# Patient Record
Sex: MALE | Race: OTHER | HISPANIC OR LATINO | URBAN - METROPOLITAN AREA
[De-identification: names, ages, dates, MRNs, and addresses within clinical notes are randomized per-mention and may not be internally consistent; named-entity substitution may affect disease eponyms.]

---

## 2018-10-10 ENCOUNTER — INPATIENT (INPATIENT)
Facility: HOSPITAL | Age: 68
LOS: 3 days | Discharge: HOME HEALTH | DRG: 579 | End: 2018-10-14
Attending: STUDENT IN AN ORGANIZED HEALTH CARE EDUCATION/TRAINING PROGRAM | Admitting: STUDENT IN AN ORGANIZED HEALTH CARE EDUCATION/TRAINING PROGRAM
Payer: COMMERCIAL

## 2018-10-10 VITALS
RESPIRATION RATE: 18 BRPM | DIASTOLIC BLOOD PRESSURE: 69 MMHG | TEMPERATURE: 98 F | SYSTOLIC BLOOD PRESSURE: 131 MMHG | WEIGHT: 219.8 LBS | OXYGEN SATURATION: 98 % | HEART RATE: 64 BPM

## 2018-10-10 DIAGNOSIS — R63.8 OTHER SYMPTOMS AND SIGNS CONCERNING FOOD AND FLUID INTAKE: ICD-10-CM

## 2018-10-10 DIAGNOSIS — I25.10 ATHEROSCLEROTIC HEART DISEASE OF NATIVE CORONARY ARTERY WITHOUT ANGINA PECTORIS: ICD-10-CM

## 2018-10-10 DIAGNOSIS — Z95.1 PRESENCE OF AORTOCORONARY BYPASS GRAFT: Chronic | ICD-10-CM

## 2018-10-10 DIAGNOSIS — G92 TOXIC ENCEPHALOPATHY: ICD-10-CM

## 2018-10-10 DIAGNOSIS — I48.91 UNSPECIFIED ATRIAL FIBRILLATION: ICD-10-CM

## 2018-10-10 DIAGNOSIS — I10 ESSENTIAL (PRIMARY) HYPERTENSION: ICD-10-CM

## 2018-10-10 DIAGNOSIS — D64.9 ANEMIA, UNSPECIFIED: ICD-10-CM

## 2018-10-10 DIAGNOSIS — Z29.9 ENCOUNTER FOR PROPHYLACTIC MEASURES, UNSPECIFIED: ICD-10-CM

## 2018-10-10 DIAGNOSIS — E11.9 TYPE 2 DIABETES MELLITUS WITHOUT COMPLICATIONS: ICD-10-CM

## 2018-10-10 DIAGNOSIS — Z91.89 OTHER SPECIFIED PERSONAL RISK FACTORS, NOT ELSEWHERE CLASSIFIED: ICD-10-CM

## 2018-10-10 DIAGNOSIS — L03.90 CELLULITIS, UNSPECIFIED: ICD-10-CM

## 2018-10-10 LAB
ALBUMIN SERPL ELPH-MCNC: 3.5 G/DL — SIGNIFICANT CHANGE UP (ref 3.3–5)
ALP SERPL-CCNC: 84 U/L — SIGNIFICANT CHANGE UP (ref 40–120)
ALT FLD-CCNC: 45 U/L — SIGNIFICANT CHANGE UP (ref 10–45)
ANION GAP SERPL CALC-SCNC: 15 MMOL/L — SIGNIFICANT CHANGE UP (ref 5–17)
APTT BLD: 33.8 SEC — SIGNIFICANT CHANGE UP (ref 27.5–37.4)
AST SERPL-CCNC: 38 U/L — SIGNIFICANT CHANGE UP (ref 10–40)
BASOPHILS NFR BLD AUTO: 0.5 % — SIGNIFICANT CHANGE UP (ref 0–2)
BILIRUB SERPL-MCNC: 0.6 MG/DL — SIGNIFICANT CHANGE UP (ref 0.2–1.2)
BUN SERPL-MCNC: 37 MG/DL — HIGH (ref 7–23)
CALCIUM SERPL-MCNC: 9.2 MG/DL — SIGNIFICANT CHANGE UP (ref 8.4–10.5)
CHLORIDE SERPL-SCNC: 100 MMOL/L — SIGNIFICANT CHANGE UP (ref 96–108)
CK MB CFR SERPL CALC: 1.6 NG/ML — SIGNIFICANT CHANGE UP (ref 0–6.7)
CK SERPL-CCNC: 48 U/L — SIGNIFICANT CHANGE UP (ref 30–200)
CO2 SERPL-SCNC: 24 MMOL/L — SIGNIFICANT CHANGE UP (ref 22–31)
CREAT SERPL-MCNC: 2.3 MG/DL — HIGH (ref 0.5–1.3)
EOSINOPHIL NFR BLD AUTO: 0.5 % — SIGNIFICANT CHANGE UP (ref 0–6)
GLUCOSE SERPL-MCNC: 116 MG/DL — HIGH (ref 70–99)
HCT VFR BLD CALC: 29.9 % — LOW (ref 39–50)
HGB BLD-MCNC: 9.9 G/DL — LOW (ref 13–17)
INR BLD: 1.27 — HIGH (ref 0.88–1.16)
LACTATE SERPL-SCNC: 1.2 MMOL/L — SIGNIFICANT CHANGE UP (ref 0.5–2)
LYMPHOCYTES # BLD AUTO: 29.4 % — SIGNIFICANT CHANGE UP (ref 13–44)
MCHC RBC-ENTMCNC: 29.5 PG — SIGNIFICANT CHANGE UP (ref 27–34)
MCHC RBC-ENTMCNC: 33.1 G/DL — SIGNIFICANT CHANGE UP (ref 32–36)
MCV RBC AUTO: 89 FL — SIGNIFICANT CHANGE UP (ref 80–100)
MONOCYTES NFR BLD AUTO: 8 % — SIGNIFICANT CHANGE UP (ref 2–14)
NEUTROPHILS NFR BLD AUTO: 61.6 % — SIGNIFICANT CHANGE UP (ref 43–77)
PLATELET # BLD AUTO: 238 K/UL — SIGNIFICANT CHANGE UP (ref 150–400)
POTASSIUM SERPL-MCNC: 3.3 MMOL/L — LOW (ref 3.5–5.3)
POTASSIUM SERPL-SCNC: 3.3 MMOL/L — LOW (ref 3.5–5.3)
PROT SERPL-MCNC: 7.4 G/DL — SIGNIFICANT CHANGE UP (ref 6–8.3)
PROTHROM AB SERPL-ACNC: 14.2 SEC — HIGH (ref 9.8–12.7)
RBC # BLD: 3.36 M/UL — LOW (ref 4.2–5.8)
RBC # FLD: 14.3 % — SIGNIFICANT CHANGE UP (ref 10.3–16.9)
SODIUM SERPL-SCNC: 139 MMOL/L — SIGNIFICANT CHANGE UP (ref 135–145)
TROPONIN T SERPL-MCNC: <0.01 NG/ML — SIGNIFICANT CHANGE UP (ref 0–0.01)
WBC # BLD: 8.1 K/UL — SIGNIFICANT CHANGE UP (ref 3.8–10.5)
WBC # FLD AUTO: 8.1 K/UL — SIGNIFICANT CHANGE UP (ref 3.8–10.5)

## 2018-10-10 PROCEDURE — 70450 CT HEAD/BRAIN W/O DYE: CPT | Mod: 26

## 2018-10-10 PROCEDURE — 99285 EMERGENCY DEPT VISIT HI MDM: CPT

## 2018-10-10 PROCEDURE — 99223 1ST HOSP IP/OBS HIGH 75: CPT | Mod: GC

## 2018-10-10 RX ORDER — INSULIN LISPRO 100/ML
VIAL (ML) SUBCUTANEOUS
Qty: 0 | Refills: 0 | Status: DISCONTINUED | OUTPATIENT
Start: 2018-10-10 | End: 2018-10-11

## 2018-10-10 RX ORDER — APIXABAN 2.5 MG/1
5 TABLET, FILM COATED ORAL EVERY 12 HOURS
Qty: 0 | Refills: 0 | Status: DISCONTINUED | OUTPATIENT
Start: 2018-10-10 | End: 2018-10-11

## 2018-10-10 RX ORDER — SODIUM CHLORIDE 9 MG/ML
1000 INJECTION, SOLUTION INTRAVENOUS
Qty: 0 | Refills: 0 | Status: DISCONTINUED | OUTPATIENT
Start: 2018-10-10 | End: 2018-10-11

## 2018-10-10 RX ORDER — SODIUM CHLORIDE 9 MG/ML
1000 INJECTION INTRAMUSCULAR; INTRAVENOUS; SUBCUTANEOUS
Qty: 0 | Refills: 0 | Status: DISCONTINUED | OUTPATIENT
Start: 2018-10-10 | End: 2018-10-14

## 2018-10-10 RX ORDER — CLOPIDOGREL BISULFATE 75 MG/1
75 TABLET, FILM COATED ORAL DAILY
Qty: 0 | Refills: 0 | Status: DISCONTINUED | OUTPATIENT
Start: 2018-10-10 | End: 2018-10-11

## 2018-10-10 RX ORDER — VANCOMYCIN HCL 1 G
1500 VIAL (EA) INTRAVENOUS EVERY 12 HOURS
Qty: 0 | Refills: 0 | Status: DISCONTINUED | OUTPATIENT
Start: 2018-10-11 | End: 2018-10-11

## 2018-10-10 RX ORDER — VANCOMYCIN HCL 1 G
1000 VIAL (EA) INTRAVENOUS ONCE
Qty: 0 | Refills: 0 | Status: COMPLETED | OUTPATIENT
Start: 2018-10-10 | End: 2018-10-10

## 2018-10-10 RX ORDER — DEXTROSE 50 % IN WATER 50 %
15 SYRINGE (ML) INTRAVENOUS ONCE
Qty: 0 | Refills: 0 | Status: DISCONTINUED | OUTPATIENT
Start: 2018-10-10 | End: 2018-10-11

## 2018-10-10 RX ORDER — CARVEDILOL PHOSPHATE 80 MG/1
40 CAPSULE, EXTENDED RELEASE ORAL EVERY 12 HOURS
Qty: 0 | Refills: 0 | Status: DISCONTINUED | OUTPATIENT
Start: 2018-10-10 | End: 2018-10-10

## 2018-10-10 RX ORDER — DEXTROSE 50 % IN WATER 50 %
12.5 SYRINGE (ML) INTRAVENOUS ONCE
Qty: 0 | Refills: 0 | Status: DISCONTINUED | OUTPATIENT
Start: 2018-10-10 | End: 2018-10-11

## 2018-10-10 RX ORDER — ATORVASTATIN CALCIUM 80 MG/1
40 TABLET, FILM COATED ORAL AT BEDTIME
Qty: 0 | Refills: 0 | Status: DISCONTINUED | OUTPATIENT
Start: 2018-10-10 | End: 2018-10-11

## 2018-10-10 RX ORDER — CARVEDILOL PHOSPHATE 80 MG/1
12.5 CAPSULE, EXTENDED RELEASE ORAL EVERY 12 HOURS
Qty: 0 | Refills: 0 | Status: DISCONTINUED | OUTPATIENT
Start: 2018-10-11 | End: 2018-10-11

## 2018-10-10 RX ORDER — DEXTROSE 50 % IN WATER 50 %
25 SYRINGE (ML) INTRAVENOUS ONCE
Qty: 0 | Refills: 0 | Status: DISCONTINUED | OUTPATIENT
Start: 2018-10-10 | End: 2018-10-11

## 2018-10-10 RX ORDER — GLUCAGON INJECTION, SOLUTION 0.5 MG/.1ML
1 INJECTION, SOLUTION SUBCUTANEOUS ONCE
Qty: 0 | Refills: 0 | Status: DISCONTINUED | OUTPATIENT
Start: 2018-10-10 | End: 2018-10-11

## 2018-10-10 RX ADMIN — Medication 250 MILLIGRAM(S): at 19:48

## 2018-10-10 NOTE — ED ADULT NURSE NOTE - OBJECTIVE STATEMENT
69 y/o M sent in by PMD for r/o septic emboli secondary to pertinent cardiac hx and hx of surgery for multiple MRSA infected abscesses/wounds. Pt has undergone several tx with abx and surgery for perirectal mrsa wound, now presents with worsening abscess on back mid scapula. Pt's original wounds began after a trip to the [place] in August where he sustained a multitude of mosquito and other bug bites. Area is hot to touch, indurated and edematous with one focal area of eschar, no purulent drainage noted. Pt also has wound to L groin/scrotum and a healing perirectal abscess on the L side. Dr Borja at bedside for evaluation. Hx cardiac stents placed, hx DM. #18g placed RAC blood cultures collected x 3, labs sent awaiting urine. Dr Borja contacted and collaborated with pt's PMD, pt medicated with vancomycin per order, pt's MRSA is sensitive to vanco per PMD. Pt denies fevers/chills/n/v/d, VSS. Awaiting urine. Pt in NAD, in contact isolation room.

## 2018-10-10 NOTE — H&P ADULT - ASSESSMENT
69 y/o M w/ PMHx notable for DM, HTN, CAD s/p PCI, CABG (2008), Afib on Eliquis, CKD and recurrent MRSA abscess who is presenting with upper left back abscess.

## 2018-10-10 NOTE — H&P ADULT - PROBLEM SELECTOR PLAN 5
Patient reports taking Victoza and Glibiride at home. Currently unsure of dosages.   -Will start patient on ISS and adjust insulin accordingly  -F/u HgbA1C

## 2018-10-10 NOTE — H&P ADULT - ATTENDING COMMENTS
Pt seen and examined at bedside on 10/10/2018 @ 2300    Agree with HPI, ROS as above.     VS, Labs, FH, SH, allergies, medications, imaging reviewed. Agree with physical exam as above     A/P: 69 y/o M w/ PMHx notable for DM, HTN, CAD s/p PCI, CABG (2008), Afib on Eliquis, CKD and recurrent MRSA abscess who is presenting with upper left back abscess.     **Cellulitis  -Purulent cellulitis with large abscess on back as described above  -Surgery consult for likely drainage  -C/w Vancomycin  -Derm consult given new pruritic rash - Benadryl PRN for pruritis     Plan otherwise as outlined above.....

## 2018-10-10 NOTE — ED ADULT TRIAGE NOTE - CHIEF COMPLAINT QUOTE
wounds after surgery - and 2 more wounds on back  and in groin - wounds were MRSA positive --sent to R/O septic emboli -- wounds have blackened areas; and R/O endocarditis

## 2018-10-10 NOTE — H&P ADULT - PMH
Atrial fibrillation    CAD (coronary artery disease)    CKD (chronic kidney disease)    Diabetes    Hypertension

## 2018-10-10 NOTE — ED PROVIDER NOTE - PHYSICAL EXAMINATION
CON: ao x 3, HENMT: clear oropharynx, soft neck, HEAD: atraumatic, CV: rrr, equal pulses b/l, RESP: cta b/l, GI: +BS, soft, nontender, no rebound, no guarding, SKIN: area of fluctuance to upper back, w/ induration, small area of induration to R groin, no drainage, no bleeding, no rash noted to perineal area, no crepitus, no evidence of jenni's on exam, MSK: no deformities, NEURO: no gross motor or sensory deficit

## 2018-10-10 NOTE — H&P ADULT - PROBLEM SELECTOR PLAN 10
1) PCP Contacted on Admission: (Y/N) --> Name & Phone #: Dr. Goel  [phone]  2) Date of Contact with PCP:  3) PCP Contacted at Discharge: (Y/N, N/A)  4) Summary of Handoff Given to PCP:   5) Post-Discharge Appointment Date and Location:

## 2018-10-10 NOTE — H&P ADULT - NSHPLABSRESULTS_GEN_ALL_CORE
.  LABS:                         9.9    8.1   )-----------( 238      ( 10 Oct 2018 19:04 )             29.9     10-10    139  |  100  |  37<H>  ----------------------------<  116<H>  3.3<L>   |  24  |  2.30<H>    Ca    9.2      10 Oct 2018 19:04    TPro  7.4  /  Alb  3.5  /  TBili  0.6  /  DBili  x   /  AST  38  /  ALT  45  /  AlkPhos  84  10-10    PT/INR - ( 10 Oct 2018 19:04 )   PT: 14.2 sec;   INR: 1.27          PTT - ( 10 Oct 2018 19:04 )  PTT:33.8 sec    CARDIAC MARKERS ( 10 Oct 2018 19:04 )  x     / <0.01 ng/mL / 48 U/L / x     / 1.6 ng/mL        Lactate, Blood: 1.2 mmoL/L (10-10 @ 19:05)      RADIOLOGY, EKG & ADDITIONAL TESTS: Reviewed.

## 2018-10-10 NOTE — H&P ADULT - FAMILY HISTORY
Father  Still living? Unknown  No family history of cardiovascular disease, Age at diagnosis: Age Unknown     Mother  Still living? Unknown  No family history of cardiovascular disease, Age at diagnosis: Age Unknown

## 2018-10-10 NOTE — H&P ADULT - PROBLEM SELECTOR PLAN 2
Patient w/ episode of total body tremors where his eyes rolled back and lost consciousness afterwards. As per wife patient was unconscious for 2-3 minutes and was confused and very slow to respond afterwards. Prior to episode patient was very diaphoretic and clammy. Concern for possible febrile seizure vs. meningitis though less likely given pt with no nuchal rigidity on exam. Patient is currently AAOx3 but is poor historian and majority of history was obtained from wife.   -CT head negative for hemorrage or infarct.   -Will place patient on VEEG   -If patient w/ no improvement in mental status will obtain LP Patient w/ episode of total body tremors where his eyes rolled back and lost consciousness afterwards. As per wife patient was unconscious for 2-3 minutes and was confused and very slow to respond afterwards. Prior to episode patient was very diaphoretic and clammy. Concern for possible febrile seizure vs. meningitis though less likely given pt with no nuchal rigidity on exam. Patient is currently AAOx3 but is poor historian and majority of history was obtained from wife.   -CT head negative for hemorrhage or infarct.   -Will place patient on VEEG   -If patient w/ no improvement in mental status will obtain LP Patient w/ episode of total body tremors where his eyes rolled back and lost consciousness afterwards. As per wife patient was unconscious for 2-3 minutes and was confused and very slow to respond afterwards. Prior to episode patient was very diaphoretic and clammy. Concern for possible febrile seizure vs. meningitis though less likely given pt with no nuchal rigidity on exam. Patient is currently AAOx3 but is poor historian and majority of history was obtained from wife.   -CT head negative for hemorrhage or infarct.   -Will place patient on VEEG Patient w/ episode of total body tremors where his eyes rolled back and lost consciousness afterwards. As per wife patient was unconscious for 2-3 minutes and was confused and very slow to respond afterwards. Prior to episode patient was very diaphoretic and clammy. Patients glucose was normal during episode (164). Concern for possible febrile seizure vs. meningitis though less likely given pt with no nuchal rigidity on exam. Patient is currently AAOx3 but is poor historian and majority of history was obtained from wife.   -CT head negative for hemorrhage or infarct.   -Will place patient on VEEG Patient w/ episode of total body tremors where his eyes rolled back and lost consciousness afterwards. As per wife patient was unconscious for 2-3 minutes and was confused and very slow to respond afterwards. Prior to episode patient was very diaphoretic and clammy. Patients glucose was normal during episode (164). Concern for possible febrile seizure vs. meningitis though less likely given pt with no nuchal rigidity on exam and is afebrile w/ no leukocytosis. Patient is currently AAOx3 but is poor historian and majority of history was obtained from wife.   -CT head negative for hemorrhage or infarct.   -Will place patient on VEEG

## 2018-10-10 NOTE — ED PROVIDER NOTE - OBJECTIVE STATEMENT
68 yom pw recurrent MRSA wound/abscess.  hx of DM, CABG, initially treated for perirectal abscess w/ vanco/doxy and outpatient surg drainage, noted additional wound to L groin, and now upper back abscess.  no fever, no chills. no cp, no sob.  no hx of valvular replacement.  wound care concern for endocarditis, and sent pt in for further eval.

## 2018-10-10 NOTE — ED PROVIDER NOTE - MEDICAL DECISION MAKING DETAILS
hx of cabg, hx of dm, hx of recurrent mrsa, previous wound culture sensitive to vanco, will empiric dose vanco, vital sign not c/w sirs, outpatient [phone] Dr. Manasa Herrera from cardiology aware of case, will f/u as cardiology

## 2018-10-10 NOTE — H&P ADULT - PROBLEM SELECTOR PLAN 7
Patient follows up with Dr. Herrera for cardiology.   -C/w Plavix 75mg daily  -Reports that he no longer takes ASA 81mg since starting Eliquis  -Will start patient on Lipitor 40mg pending med rec

## 2018-10-10 NOTE — H&P ADULT - HISTORY OF PRESENT ILLNESS
67 y/o M w/ PMHx notable for DM, HTN, CAD s/p PCI, CABG (2008), Afib on Eliquis, CKD and recurrent MRSA abscess who is presenting with upper left back abscess. Patient reports that in August he was in [place] where he had several bug bites that were very pruritic. Upon return from the [place] patient reports that the bug bite on his left gluteal region had become inflamed and larger in size and was draining yellow thick fluid. At that time patient went to the Emergency Department at [facility] in [place] where he had an I&D and was admitted for about 1 week to receive IV abx. At that time patient was told that he was being treated for MRSA. Upon discharge patient was discharged on 2 different oral abx and was advised to follow up with outpatient wound care. Course further complicated when last weekend patient and wife went to [place]. While there the wife reports that she changed the christelle-gluteal abscess bandage and noted that his left groin had developed an abscess as well with yellow/green fluid draining. On Sunday evening patient had an episode where the wife reports his eyes rolled back and he had "full body tremors" and lost consciousness. Patient was unconscious for about 2-3 minutes and upon awakening wife reports that patient was very confused and slow to respond. During the episode wife reports that patient was very diaphoretic and clammy. Wife also reports that patient had several episodes where he had loss of his bowel. No loss of bladder or tongue biting. No history of seizures. Given this episode patient and wife returned to [place] early Tuesday morning and went to the ED at [facility] where he was given dose of IV abx and advised to follow up with his wound care nurse. Patient and his wife went to wound care nurse who was concerned for patients left groin and recommended patient go to a different [facility] ER where he was given additional dose of IV abx and recommended to follow up with his outpatient PCP. Wife reports that after the IV abx his left groin abscess had resolved but that he had developed a new abscess on his left upper shoulder. She reports that she tried to squeeze it but was unable to due to significant pain. They saw their outpatient PCP on day of admission and she advised patient come to the ED given patients symptoms and newely developed left upper back abscess.    In the ED: T: 98.1, HR: 64, BP: 131/69, RR: 18, O2: 98%  Labs notable for Hgb of 9.9. No leukocytosis. Potassium of 3.3. Cr 2.30  CT head negative for hemorrhage or acute infarct. Inflammatory disease involving the left posterior ethmoid and sphenoid sinus.   Patient given Vancomycin 1g x1. Blood cultures obtained.

## 2018-10-10 NOTE — H&P ADULT - PROBLEM SELECTOR PLAN 1
Patient w/ recent MRSA abscess of left christelle-gluteal region with tract formation to left groin that has since improved s/p IV abx and 2 various oral abx. Patient is now presenting w/ Left upper back abscess that is about 9x7cm erythematous, tender with palpable induration. No drainage noted.   -s/p Vancomycin 1g in the ED. C/w Vancomycin 1000mg BID  -F/u blood cultures  -Surgical Consult in AM for potential drainage Patient w/ recent MRSA abscess of left christelle-gluteal region with tract formation to left groin that has since improved s/p IV abx and 2 various oral abx. Patient is now presenting w/ Left upper back abscess that is about 9x7cm erythematous, tender with palpable induration. No drainage noted. Given patient with recurrent MRSA will need to rule out valvular disease and endocarditis.   -s/p Vancomycin 1g in the ED. C/w Vancomycin 1000mg BID  -F/u blood cultures  -F/u Echocardiogram  -Surgical Consult in AM for potential drainage Patient w/ recent MRSA abscess of left christelle-gluteal region with tract formation to left groin that has since improved s/p IV abx and 2 various oral abx. Patient is now presenting w/ Left upper back abscess that is about 9x7cm erythematous, tender with palpable induration. No drainage noted. Given patient with recurrent MRSA will need to rule out valvular disease and endocarditis.   -s/p Vancomycin 1g in the ED. C/w Vancomycin 1000mg BID  -F/u blood cultures  -F/u Echocardiogram  -Surgical consult for potential I&D Patient w/ recent purulent MRSA abscess of left christelle-gluteal region with tract formation to left groin that has since improved s/p IV abx and 2 various oral abx. Patient is now presenting w/ Left upper back abscess that is about 9x7cm erythematous, tender with palpable induration. No drainage noted.   -s/p Vancomycin 1g in the ED. C/w Vancomycin 1000mg BID  -F/u blood cultures  -Surgical consult for potential I&D Patient w/ recent purulent MRSA abscess of left christelle-gluteal region with tract formation to left groin that has since improved s/p IV abx and 2 various oral abx. Patient is now presenting w/ Left upper back abscess that is about 9x7cm erythematous, tender with palpable induration. No drainage noted.   -s/p Vancomycin 1g in the ED. C/w Vancomycin 1500mg BID  -F/u blood cultures  -Surgical consult for potential I&D. Appreciate recs    #Pustular rash  -Patient w/ notable pustules on LE, abdomen and back that are pruritic. Reports that they began since returning from the [place].   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

## 2018-10-10 NOTE — H&P ADULT - PROBLEM SELECTOR PLAN 3
Patient w/ history of Atrial fibrillation. Currently rate controlled. Patient and wife are unable to provide updated list of medications.   -Patient reports he is on Eliquis.    #CKD stage 4:  -GFR of 29  -Avoid nephrotoxic agents Patient w/ history of Atrial fibrillation. Currently rate controlled.  -C/w Eliquis 5mg BID  -C/w Coreg 40mg BID     #CKD stage 4:  -GFR of 29  -Avoid nephrotoxic agents Patient w/ history of Atrial fibrillation. Currently rate controlled.  -C/w Eliquis 5mg BID  -C/w Coreg 12.5mg BID     #CKD stage 4:  -GFR of 29  -Avoid nephrotoxic agents

## 2018-10-10 NOTE — H&P ADULT - PROBLEM SELECTOR PLAN 6
Patient reports that he takes Amlodopine 10mg at home. Will hold off on restarting medication given he is normotensive. Will need to obtain med rec in AM. -Patient takes Norvasc 5mg daily, Lisinopril 20mg daily   -Will hold for now as patient is normotensive

## 2018-10-10 NOTE — H&P ADULT - PROBLEM SELECTOR PLAN 4
Hgb of 9.9 on admission. Etiology likely secondary to iron deficiency anemia in the setting of CKD.   -F/u iron studies  -maintain active T&S   -No active signs of bleeding

## 2018-10-11 LAB
ALBUMIN SERPL ELPH-MCNC: 2.7 G/DL — LOW (ref 3.3–5)
ALP SERPL-CCNC: 70 U/L — SIGNIFICANT CHANGE UP (ref 40–120)
ALT FLD-CCNC: 34 U/L — SIGNIFICANT CHANGE UP (ref 10–45)
ANION GAP SERPL CALC-SCNC: 13 MMOL/L — SIGNIFICANT CHANGE UP (ref 5–17)
APTT BLD: 30.4 SEC — SIGNIFICANT CHANGE UP (ref 27.5–37.4)
AST SERPL-CCNC: 28 U/L — SIGNIFICANT CHANGE UP (ref 10–40)
BASOPHILS NFR BLD AUTO: 0.3 % — SIGNIFICANT CHANGE UP (ref 0–2)
BILIRUB SERPL-MCNC: 0.5 MG/DL — SIGNIFICANT CHANGE UP (ref 0.2–1.2)
BLD GP AB SCN SERPL QL: NEGATIVE — SIGNIFICANT CHANGE UP
BUN SERPL-MCNC: 34 MG/DL — HIGH (ref 7–23)
CALCIUM SERPL-MCNC: 8.4 MG/DL — SIGNIFICANT CHANGE UP (ref 8.4–10.5)
CHLORIDE SERPL-SCNC: 105 MMOL/L — SIGNIFICANT CHANGE UP (ref 96–108)
CO2 SERPL-SCNC: 23 MMOL/L — SIGNIFICANT CHANGE UP (ref 22–31)
CREAT SERPL-MCNC: 1.63 MG/DL — HIGH (ref 0.5–1.3)
CRP SERPL-MCNC: 4.62 MG/DL — HIGH (ref 0–0.4)
EOSINOPHIL NFR BLD AUTO: 1.1 % — SIGNIFICANT CHANGE UP (ref 0–6)
ERYTHROCYTE [SEDIMENTATION RATE] IN BLOOD: 67 MM/HR — HIGH
FERRITIN SERPL-MCNC: 426 NG/ML — HIGH (ref 30–400)
GLUCOSE SERPL-MCNC: 89 MG/DL — SIGNIFICANT CHANGE UP (ref 70–99)
HAPTOGLOB SERPL-MCNC: 199 MG/DL — SIGNIFICANT CHANGE UP (ref 34–200)
HBA1C BLD-MCNC: 7.1 % — HIGH (ref 4–5.6)
HCT VFR BLD CALC: 28.1 % — LOW (ref 39–50)
HGB BLD-MCNC: 9.2 G/DL — LOW (ref 13–17)
INR BLD: 1.17 — HIGH (ref 0.88–1.16)
IRON SATN MFR SERPL: 25 % — SIGNIFICANT CHANGE UP (ref 16–55)
IRON SATN MFR SERPL: 35 UG/DL — LOW (ref 45–165)
LDH SERPL L TO P-CCNC: 175 U/L — SIGNIFICANT CHANGE UP (ref 50–242)
LYMPHOCYTES # BLD AUTO: 31.5 % — SIGNIFICANT CHANGE UP (ref 13–44)
MCHC RBC-ENTMCNC: 29.5 PG — SIGNIFICANT CHANGE UP (ref 27–34)
MCHC RBC-ENTMCNC: 32.7 G/DL — SIGNIFICANT CHANGE UP (ref 32–36)
MCV RBC AUTO: 90.1 FL — SIGNIFICANT CHANGE UP (ref 80–100)
MONOCYTES NFR BLD AUTO: 10.3 % — SIGNIFICANT CHANGE UP (ref 2–14)
NEUTROPHILS NFR BLD AUTO: 56.8 % — SIGNIFICANT CHANGE UP (ref 43–77)
PLATELET # BLD AUTO: 212 K/UL — SIGNIFICANT CHANGE UP (ref 150–400)
POTASSIUM SERPL-MCNC: 3.1 MMOL/L — LOW (ref 3.5–5.3)
POTASSIUM SERPL-SCNC: 3.1 MMOL/L — LOW (ref 3.5–5.3)
PROT SERPL-MCNC: 6.1 G/DL — SIGNIFICANT CHANGE UP (ref 6–8.3)
PROTHROM AB SERPL-ACNC: 13 SEC — HIGH (ref 9.8–12.7)
RBC # BLD: 3.12 M/UL — LOW (ref 4.2–5.8)
RBC # FLD: 14.1 % — SIGNIFICANT CHANGE UP (ref 10.3–16.9)
RH IG SCN BLD-IMP: POSITIVE — SIGNIFICANT CHANGE UP
SODIUM SERPL-SCNC: 141 MMOL/L — SIGNIFICANT CHANGE UP (ref 135–145)
TIBC SERPL-MCNC: 138 UG/DL — LOW (ref 220–430)
TRANSFERRIN SERPL-MCNC: 110 MG/DL — LOW (ref 200–360)
UIBC SERPL-MCNC: 103 UG/DL — LOW (ref 110–370)
WBC # BLD: 7.2 K/UL — SIGNIFICANT CHANGE UP (ref 3.8–10.5)
WBC # FLD AUTO: 7.2 K/UL — SIGNIFICANT CHANGE UP (ref 3.8–10.5)

## 2018-10-11 PROCEDURE — 99233 SBSQ HOSP IP/OBS HIGH 50: CPT | Mod: GC

## 2018-10-11 PROCEDURE — 99253 IP/OBS CNSLTJ NEW/EST LOW 45: CPT | Mod: GC

## 2018-10-11 RX ORDER — AMLODIPINE BESYLATE 2.5 MG/1
1 TABLET ORAL
Qty: 0 | Refills: 0 | COMMUNITY

## 2018-10-11 RX ORDER — VANCOMYCIN HCL 1 G
1250 VIAL (EA) INTRAVENOUS EVERY 12 HOURS
Qty: 0 | Refills: 0 | Status: DISCONTINUED | OUTPATIENT
Start: 2018-10-11 | End: 2018-10-12

## 2018-10-11 RX ORDER — CLOPIDOGREL BISULFATE 75 MG/1
1 TABLET, FILM COATED ORAL
Qty: 0 | Refills: 0 | COMMUNITY

## 2018-10-11 RX ORDER — CLOPIDOGREL BISULFATE 75 MG/1
75 TABLET, FILM COATED ORAL DAILY
Qty: 0 | Refills: 0 | Status: DISCONTINUED | OUTPATIENT
Start: 2018-10-11 | End: 2018-10-14

## 2018-10-11 RX ORDER — FUROSEMIDE 40 MG
1 TABLET ORAL
Qty: 0 | Refills: 0 | COMMUNITY

## 2018-10-11 RX ORDER — APIXABAN 2.5 MG/1
1 TABLET, FILM COATED ORAL
Qty: 0 | Refills: 0 | COMMUNITY

## 2018-10-11 RX ORDER — APIXABAN 2.5 MG/1
5 TABLET, FILM COATED ORAL EVERY 12 HOURS
Qty: 0 | Refills: 0 | Status: DISCONTINUED | OUTPATIENT
Start: 2018-10-11 | End: 2018-10-14

## 2018-10-11 RX ORDER — MORPHINE SULFATE 50 MG/1
2 CAPSULE, EXTENDED RELEASE ORAL EVERY 6 HOURS
Qty: 0 | Refills: 0 | Status: DISCONTINUED | OUTPATIENT
Start: 2018-10-11 | End: 2018-10-14

## 2018-10-11 RX ORDER — VANCOMYCIN HCL 1 G
1500 VIAL (EA) INTRAVENOUS EVERY 12 HOURS
Qty: 0 | Refills: 0 | Status: DISCONTINUED | OUTPATIENT
Start: 2018-10-11 | End: 2018-10-11

## 2018-10-11 RX ORDER — POTASSIUM CHLORIDE 20 MEQ
20 PACKET (EA) ORAL ONCE
Qty: 0 | Refills: 0 | Status: COMPLETED | OUTPATIENT
Start: 2018-10-11 | End: 2018-10-11

## 2018-10-11 RX ORDER — ACETAMINOPHEN 500 MG
650 TABLET ORAL EVERY 6 HOURS
Qty: 0 | Refills: 0 | Status: DISCONTINUED | OUTPATIENT
Start: 2018-10-11 | End: 2018-10-14

## 2018-10-11 RX ORDER — LISINOPRIL 2.5 MG/1
1 TABLET ORAL
Qty: 0 | Refills: 0 | COMMUNITY

## 2018-10-11 RX ORDER — CARVEDILOL PHOSPHATE 80 MG/1
1 CAPSULE, EXTENDED RELEASE ORAL
Qty: 0 | Refills: 0 | COMMUNITY

## 2018-10-11 RX ADMIN — Medication 20 MILLIEQUIVALENT(S): at 08:57

## 2018-10-11 RX ADMIN — Medication 166.67 MILLIGRAM(S): at 18:45

## 2018-10-11 RX ADMIN — Medication 300 MILLIGRAM(S): at 07:02

## 2018-10-11 RX ADMIN — CARVEDILOL PHOSPHATE 12.5 MILLIGRAM(S): 80 CAPSULE, EXTENDED RELEASE ORAL at 07:02

## 2018-10-11 NOTE — PROGRESS NOTE ADULT - PROBLEM SELECTOR PLAN 2
Patient w/ episode of total body tremors where his eyes rolled back and lost consciousness afterwards. As per wife patient was unconscious for 2-3 minutes and was confused and very slow to respond afterwards. Prior to episode patient was very diaphoretic and clammy. Patients glucose was normal during episode (164). Concern for possible febrile seizure vs. meningitis though less likely given pt with no nuchal rigidity on exam and is afebrile w/ no leukocytosis. Patient is currently AAOx3 but is poor historian and majority of history was obtained from wife. Wife stating mental status improved but still "spacing out" and not as baseline   -CT head negative for hemorrhage or infarct.   -Will place patient on VEEG

## 2018-10-11 NOTE — CONSULT NOTE ADULT - ATTENDING COMMENTS
I have reviewed the medical record, including laboratory and radiographic studies, interviewed and examined the patient and discussed the plan with Dr. Marroquin, the ID Resident.  Agree with above. Will continue to follow with you – ID service.

## 2018-10-11 NOTE — PROGRESS NOTE ADULT - PROBLEM SELECTOR PLAN 3
Patient w/ history of Atrial fibrillation. Currently rate controlled.  -C/w Eliquis 5mg BID  -C/w Coreg 12.5mg BID     #CKD stage 4:  -GFR of 29  -Avoid nephrotoxic agents

## 2018-10-11 NOTE — CONSULT NOTE ADULT - ASSESSMENT
Patient is a 69 yo man w/PMH DM II, HTN, CAD s/p PCI, CABG (2008), Afib on Eliquis, CKD and recurrent MRSA abscesses in the setting of likely folliculitis who is presenting with upper left back abscess now s/p I&D. Patient has received multiple recent courses of IV antibiotics (unsure which ones) and has been told he had MRSA in one of his wounds (never in the blood) at other hospitals, therefore it is likely he again has an abscess growing MRSA.    Recommendations are as follows:    1. S/p I&D- will f/u cultures  2. C/w vancomycin for now with monitoring of trough before 4th dose.  3. F/u BC- currently NGTD  4. Contact isolation- must presume MRSA given extensive history  5. For folliculitis- may benefit from chlorhexidine wipes as an outpatient.    ID will continue to follow with you. Patient is a 67 yo man w/PMH DM II, HTN, CAD s/p PCI, CABG (2008), Afib on Eliquis, CKD and recurrent MRSA abscesses in the setting of likely folliculitis who is presenting with upper left back abscess now s/p I&D. Patient has received multiple recent courses of IV antibiotics (unsure which ones) and has been told he had MRSA in one of his wounds (never in the blood) at other hospitals, therefore it is likely he again has an abscess growing MRSA.    Recommendations are as follows:    1. S/p I&D- will f/u cultures  2. C/w vancomycin for now with monitoring of trough before 4th dose.  3. F/u BC- currently NGTD  4. Contact isolation- must presume MRSA given extensive history  5. For folliculitis- may benefit from chlorhexidine washes or wipes as an outpatient.    ID will continue to follow with you.

## 2018-10-11 NOTE — BRIEF OPERATIVE NOTE - PROCEDURE
<<-----Click on this checkbox to enter Procedure Incision and debridement of back  10/11/2018  wound vac placement  Active  [email or web address]

## 2018-10-11 NOTE — PROGRESS NOTE ADULT - PROBLEM SELECTOR PLAN 6
-Patient takes Norvasc 5mg daily, Lisinopril 20mg daily   -Will hold for now as patient is normotensive

## 2018-10-11 NOTE — PROGRESS NOTE ADULT - PROBLEM SELECTOR PLAN 1
Patient w/ recent purulent MRSA abscess of left christelle-gluteal region with tract formation to left groin that has since improved s/p IV abx and 2 various oral abx. Patient is now presenting w/ Left upper back abscess that is about 9x7cm erythematous, tender with palpable induration. No drainage noted.   -s/p Vancomycin 1g in the ED. C/w Vancomycin 1500mg BID  -F/u blood cultures  -F/u surgical wound cx    #Pustular rash  -Patient w/ notable pustules on LE, abdomen and back that are pruritic. Reports that they began since returning from [place].   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

## 2018-10-11 NOTE — CONSULT NOTE ADULT - SUBJECTIVE AND OBJECTIVE BOX
67 y/o M w/ PMHx DM, HTN, CAD s/p PCI, CABG (2008), Afib on Eliquis, CKD and recurrent MRSA abscess, presented with upper back abscess. Started last Tuesday with back pain and swelling and itchyness, swelling has becoming worse and increase significantly after he was coming on a flight back from [place], He endorse feeling of hotness without documented fevers, Chills, he denies any drainage, trauma to the area.    He had a similar abscess in August he was in the [place] where he had several bug bites that were very pruritic. which was on the left gluteal region with thick yellow drainage. S/p I&D in [facility] and was admitted for about 1 week to receive IV abx. At that time patient was told that he was being treated for MRSA.    Patient had an episode of LOC last Sunday and he presented for further work up    Vital Signs Last 24 Hrs  T(C): 36.9 (10 Oct 2018 22:28), Max: 36.9 (10 Oct 2018 22:28)  T(F): 98.4 (10 Oct 2018 22:28), Max: 98.4 (10 Oct 2018 22:28)  HR: 61 (10 Oct 2018 22:28) (60 - 64)  BP: 118/60 (10 Oct 2018 22:28) (118/60 - 131/69)  BP(mean): --  RR: 16 (10 Oct 2018 22:28) (16 - 18)  SpO2: 99% (10 Oct 2018 22:28) (98% - 99%)    Physical Exam:  Pulmonary: Nonlabored breathing, no respiratory distress  Abdominal: soft, NT/ND, obese  Back:  Upper interscapular erythema and induration 10x7cm  Small amount of purulent fluid coming out of small opening  No fluctuance       LABS:                        9.9    8.1   )-----------( 238      ( 10 Oct 2018 19:04 )             29.9     10-10    139  |  100  |  37<H>  ----------------------------<  116<H>  3.3<L>   |  24  |  2.30<H>    Ca    9.2      10 Oct 2018 19:04    TPro  7.4  /  Alb  3.5  /  TBili  0.6  /  DBili  x   /  AST  38  /  ALT  45  /  AlkPhos  84  10-10    PT/INR - ( 10 Oct 2018 19:04 )   PT: 14.2 sec;   INR: 1.27          PTT - ( 10 Oct 2018 19:04 )  PTT:33.8 sec  CAPILLARY BLOOD GLUCOSE          LIVER FUNCTIONS - ( 10 Oct 2018 19:04 )  Alb: 3.5 g/dL / Pro: 7.4 g/dL / ALK PHOS: 84 U/L / ALT: 45 U/L / AST: 38 U/L / GGT: x

## 2018-10-11 NOTE — PROGRESS NOTE ADULT - SUBJECTIVE AND OBJECTIVE BOX
SUBJECTIVE: OR today for I+D of abscess    carvedilol 12.5 milliGRAM(s) Oral every 12 hours  vancomycin  IVPB 1500 milliGRAM(s) IV Intermittent every 12 hours      Vital Signs Last 24 Hrs  T(C): 36.9 (11 Oct 2018 08:47), Max: 37.2 (11 Oct 2018 01:49)  T(F): 98.5 (11 Oct 2018 08:47), Max: 99 (11 Oct 2018 01:49)  HR: 52 (11 Oct 2018 08:47) (52 - 86)  BP: 128/64 (11 Oct 2018 08:47) (118/60 - 160/76)  BP(mean): --  RR: 17 (11 Oct 2018 08:47) (15 - 18)  SpO2: 99% (11 Oct 2018 08:47) (96% - 99%)  I&O's Detail    10 Oct 2018 07:01  -  11 Oct 2018 07:00  --------------------------------------------------------  IN:    sodium chloride 0.9%.: 210 mL  Total IN: 210 mL    OUT:  Total OUT: 0 mL    Total NET: 210 mL          Pulmonary: Nonlabored breathing, no respiratory distress  Abdominal: soft, NT/ND, obese  Back: Upper interscapular erythema and induration 10x7cm. Drainage of pus from abscess.         LABS:                        9.2    7.2   )-----------( 212      ( 11 Oct 2018 05:45 )             28.1     10-11    141  |  105  |  34<H>  ----------------------------<  89  3.1<L>   |  23  |  1.63<H>    Ca    8.4      11 Oct 2018 05:45    TPro  6.1  /  Alb  2.7<L>  /  TBili  0.5  /  DBili  x   /  AST  28  /  ALT  34  /  AlkPhos  70  10-11    PT/INR - ( 11 Oct 2018 05:45 )   PT: 13.0 sec;   INR: 1.17          PTT - ( 11 Oct 2018 05:45 )  PTT:30.4 sec      RADIOLOGY & ADDITIONAL STUDIES:

## 2018-10-11 NOTE — PROGRESS NOTE ADULT - SUBJECTIVE AND OBJECTIVE BOX
INTERVAL HPI/OVERNIGHT EVENTS:  Patient was seen and examined at bedside. As per nurse and patient, no o/n events, patient resting comfortably. Unable to give history, questions answered by wife. Back pain controlled at this time. No complaints at this time. Patient denies: fever, chills, dizziness, weakness, HA, Changes in vision, CP, palpitations, SOB, cough, N/V/D/C, dysuria, changes in bowel movements, LE edema. ROS otherwise negative.    VITAL SIGNS:  T(F): 98.5 (10-11-18 @ 08:47)  HR: 52 (10-11-18 @ 08:47)  BP: 128/64 (10-11-18 @ 08:47)  RR: 17 (10-11-18 @ 08:47)  SpO2: 99% (10-11-18 @ 08:47)  Wt(kg): --    PHYSICAL EXAM:    Constitutional: WDWN, NAD  HEENT: PERRL, EOMI, sclera non-icteric, neck supple, trachea midline, no masses, no JVD, MMM, good dentition  Respiratory: CTA b/l, good air entry b/l, no wheezing, no rhonchi, no rales, without accessory muscle use and no intercostal retractions  Cardiovascular: RRR, normal S1S2, no M/R/G  Gastrointestinal: soft, NTND, no masses palpable, BS normal  Extremities: Warm, well perfused, pulses equal bilateral upper and lower extremities, no edema, no clubbing  Neurological: AAOx3, CN Grossly intact  Skin: 4x5" erythematous, grossly swollen wound in back, s/p i&d, purulent drainage, numerous bite-like scattered non? purulent lesions on chest (new) and healed lesions on thighs, left sided     MEDICATIONS  (STANDING):  atorvastatin 40 milliGRAM(s) Oral at bedtime  carvedilol 12.5 milliGRAM(s) Oral every 12 hours  dextrose 5%. 1000 milliLiter(s) (50 mL/Hr) IV Continuous <Continuous>  dextrose 50% Injectable 12.5 Gram(s) IV Push once  dextrose 50% Injectable 25 Gram(s) IV Push once  dextrose 50% Injectable 25 Gram(s) IV Push once  insulin lispro (HumaLOG) corrective regimen sliding scale   SubCutaneous Before meals and at bedtime  sodium chloride 0.9%. 1000 milliLiter(s) (70 mL/Hr) IV Continuous <Continuous>  vancomycin  IVPB 1500 milliGRAM(s) IV Intermittent every 12 hours    MEDICATIONS  (PRN):  dextrose 40% Gel 15 Gram(s) Oral once PRN Blood Glucose LESS THAN 70 milliGRAM(s)/deciliter  glucagon  Injectable 1 milliGRAM(s) IntraMuscular once PRN Glucose LESS THAN 70 milligrams/deciliter      Allergies    No Known Allergies    Intolerances        LABS:                        9.2    7.2   )-----------( 212      ( 11 Oct 2018 05:45 )             28.1     10-11    141  |  105  |  34<H>  ----------------------------<  89  3.1<L>   |  23  |  1.63<H>    Ca    8.4      11 Oct 2018 05:45    TPro  6.1  /  Alb  2.7<L>  /  TBili  0.5  /  DBili  x   /  AST  28  /  ALT  34  /  AlkPhos  70  10-11    PT/INR - ( 11 Oct 2018 05:45 )   PT: 13.0 sec;   INR: 1.17          PTT - ( 11 Oct 2018 05:45 )  PTT:30.4 sec      RADIOLOGY & ADDITIONAL TESTS:  Reviewed INTERVAL HPI/OVERNIGHT EVENTS:  Patient was seen and examined at bedside. As per nurse and patient, no o/n events, patient resting comfortably. Unable to give history, questions answered by wife. Back pain controlled at this time. No complaints at this time. Patient denies: fever, chills, dizziness, weakness, HA, Changes in vision, CP, palpitations, SOB, cough, N/V/D/C, dysuria, changes in bowel movements, LE edema. ROS otherwise negative.    VITAL SIGNS:  T(F): 98.5 (10-11-18 @ 08:47)  HR: 52 (10-11-18 @ 08:47)  BP: 128/64 (10-11-18 @ 08:47)  RR: 17 (10-11-18 @ 08:47)  SpO2: 99% (10-11-18 @ 08:47)  Wt(kg): --    PHYSICAL EXAM:    Constitutional: WDWN, NAD  HEENT: PERRL, EOMI, sclera non-icteric, neck supple, trachea midline, no masses, no JVD, MMM, good dentition  Respiratory: CTA b/l, good air entry b/l, no wheezing, no rhonchi, no rales, without accessory muscle use and no intercostal retractions  Cardiovascular: RRR, normal S1S2, no M/R/G  Gastrointestinal: soft, NTND, no masses palpable, BS normal  Extremities: Warm, well perfused, pulses equal bilateral upper and lower extremities, no edema, no clubbing  Neurological: AAOx3, CN Grossly intact  Skin: 4x5" erythematous, grossly swollen wound in back, s/p i&d performed bedisde, now free-flowing purulent drainage, also numerous bite-like scattered non? purulent lesions on chest (new) and healed lesions on thighs, left sided     MEDICATIONS  (STANDING):  atorvastatin 40 milliGRAM(s) Oral at bedtime  carvedilol 12.5 milliGRAM(s) Oral every 12 hours  dextrose 5%. 1000 milliLiter(s) (50 mL/Hr) IV Continuous <Continuous>  dextrose 50% Injectable 12.5 Gram(s) IV Push once  dextrose 50% Injectable 25 Gram(s) IV Push once  dextrose 50% Injectable 25 Gram(s) IV Push once  insulin lispro (HumaLOG) corrective regimen sliding scale   SubCutaneous Before meals and at bedtime  sodium chloride 0.9%. 1000 milliLiter(s) (70 mL/Hr) IV Continuous <Continuous>  vancomycin  IVPB 1500 milliGRAM(s) IV Intermittent every 12 hours    MEDICATIONS  (PRN):  dextrose 40% Gel 15 Gram(s) Oral once PRN Blood Glucose LESS THAN 70 milliGRAM(s)/deciliter  glucagon  Injectable 1 milliGRAM(s) IntraMuscular once PRN Glucose LESS THAN 70 milligrams/deciliter      Allergies    No Known Allergies    Intolerances        LABS:                        9.2    7.2   )-----------( 212      ( 11 Oct 2018 05:45 )             28.1     10-11    141  |  105  |  34<H>  ----------------------------<  89  3.1<L>   |  23  |  1.63<H>    Ca    8.4      11 Oct 2018 05:45    TPro  6.1  /  Alb  2.7<L>  /  TBili  0.5  /  DBili  x   /  AST  28  /  ALT  34  /  AlkPhos  70  10-11    PT/INR - ( 11 Oct 2018 05:45 )   PT: 13.0 sec;   INR: 1.17          PTT - ( 11 Oct 2018 05:45 )  PTT:30.4 sec      RADIOLOGY & ADDITIONAL TESTS:  Reviewed

## 2018-10-11 NOTE — BRIEF OPERATIVE NOTE - OPERATION/FINDINGS
Abscess cavity incised and cultured. Approximately 40cc purulence drained. Wound lavaged. Wound vac placed.

## 2018-10-11 NOTE — CONSULT NOTE ADULT - SUBJECTIVE AND OBJECTIVE BOX
INFECTIOUS DISEASE SERVICE INITIAL CONSULT NOTE    HPI:  69 y/o M w/ PMHx notable for DM, HTN, CAD s/p PCI, CABG (2008), Afib on Eliquis, CKD and recurrent MRSA abscess who is presenting with upper left back abscess. Patient reports that in August he was in the [place] where he had several bug bites that were very pruritic. Upon return from the [place] patient reports that the bug bite on his left gluteal region had become inflamed and larger in size and was draining yellow thick fluid. At that time patient went to the Emergency Department at [facility] in [place] where he had an I&D and was admitted for about 1 week to receive IV abx. At that time patient was told that he was being treated for MRSA. Upon discharge patient was discharged on 2 different oral abx and was advised to follow up with outpatient wound care. Course further complicated when last weekend patient and wife went to [place]. While there the wife reports that she changed the christelle-gluteal abscess bandage and noted that his left groin had developed an abscess as well with yellow/green fluid draining. On Sunday evening patient had an episode where the wife reports his eyes rolled back and he had "full body tremors" and lost consciousness. Patient was unconscious for about 2-3 minutes and upon awakening wife reports that patient was very confused and slow to respond. During the episode wife reports that patient was very diaphoretic and clammy. Wife also reports that patient had several episodes where he had loss of his bowel. No loss of bladder or tongue biting. No history of seizures. Given this episode patient and wife returned to [place] early Tuesday morning and went to the ED at [facility] where he was given dose of IV abx and advised to follow up with his wound care nurse. Patient and his wife went to wound care nurse who was concerned for patients left groin and recommended patient go to a different [facility] ER where he was given additional dose of IV abx and recommended to follow up with his outpatient PCP. Wife reports that after the IV abx his left groin abscess had resolved but that he had developed a new abscess on his left upper shoulder. She reports that she tried to squeeze it but was unable to due to significant pain. They saw their outpatient PCP on day of admission and she advised patient come to the ED given patients symptoms and newely developed left upper back abscess.    In the ED: T: 98.1, HR: 64, BP: 131/69, RR: 18, O2: 98%  Labs notable for Hgb of 9.9. No leukocytosis. Potassium of 3.3. Cr 2.30  CT head negative for hemorrhage or acute infarct. Inflammatory disease involving the left posterior ethmoid and sphenoid sinus.   Patient given Vancomycin 1g x1. Blood cultures obtained. (10 Oct 2018 21:06)      ADDITIONAL ID HPI: Patient gives similar history to above noted. Reports he started experiencing pruritis around June, causing him to scratch. He did not use any new detergent, body products, introduce any new allergens to his environment such as new pets, did not change his diet to include potential allergens. He first developed his gluteal abscess as above in August which improved with treatment, he is unsure of the names of medications but at some point was given Bactrim. He was told he had MRSA but never was told he had MRSA in the blood. Current left shoulder abscess started Monday 10/1 as an olive sized bump under his skin, increased in size and began expanding, Friday 10/5 he developed extreme pain and warmth and felt feverish with chills, presented to ED at [facility] and was given another dose of IV abx with improvement. L. "groin abscess" really more of a scrotal abscess, now improved, and seizure-like episode described above. Patient has recently traveled to [place] and [facility], but his pruritis started before then and he otherwise has no recent travel.     PAST MEDICAL & SURGICAL HISTORY:  Atrial fibrillation  CKD (chronic kidney disease)  CAD (coronary artery disease)  Hypertension  Diabetes  S/P CABG (coronary artery bypass graft)      REVIEW OF SYSTEMS:  Otherwise negative other than what is stated in the HPI.    ACTIVE ANTIMICROBIAL/ANTIBIOTIC MEDICATIONS:  vancomycin  IVPB 1250 milliGRAM(s) IV Intermittent every 12 hours      PRIOR ANTIMICROBIAL HISTORY ON THIS ADMISSION:  vancomycin  IVPB   250 mL/Hr IV Intermittent (10-10-18 @ 19:48)    vancomycin  IVPB   300 mL/Hr IV Intermittent (10-11-18 @ 07:02)        OTHER MEDICATIONS:  acetaminophen   Tablet .. 650 milliGRAM(s) Oral every 6 hours PRN  morphine  - Injectable 2 milliGRAM(s) IV Push every 6 hours PRN  sodium chloride 0.9%. 1000 milliLiter(s) IV Continuous <Continuous>      ALLERGIES:  Allergies    No Known Allergies    Intolerances        SOCIAL HISTORY: [place] born, lives in the [place]. Denies smoking, alcohol, drugs, no pets. 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 Recent travel to [place] in July,  in August.     FAMILY HISTORY:  No family history of cardiovascular disease (Father, Mother)      VITAL SIGNS:  ICU Vital Signs Last 24 Hrs  T(C): 36.4 (11 Oct 2018 17:25), Max: 37.2 (11 Oct 2018 01:49)  T(F): 97.6 (11 Oct 2018 17:25), Max: 99 (11 Oct 2018 01:49)  HR: 55 (11 Oct 2018 17:25) (46 - 86)  BP: 119/66 (11 Oct 2018 17:25) (85/55 - 160/76)  BP(mean): 91 (11 Oct 2018 14:39) (66 - 91)  ABP: --  ABP(mean): --  RR: 18 (11 Oct 2018 17:25) (9 - 20)  SpO2: 95% (11 Oct 2018 17:25) (92% - 99%)      PHYSICAL EXAM:  Constitutional: WDWN  Head: NC/AT  Eyes: PERRL; anicteric sclera  ENMT: no rhinorrhea; no sinus tenderness on palpation; no oropharyngeal lesions, erythema, or exudates	  Neck: supple; no JVD or LAD  Respiratory: CTA B/L  Cardiovascular: +S1/S2, bradycardic, RR, sternotomy scar  Gastrointestinal: soft, NT/ND; intact BS; no HSM  : healing lesion on scrotum, eschar noted.   Extremities: WWP; no clubbing, cyanosis, or edema  Vascular: 2+ radial, DP/PT pulses B/L  Dermatologic: Red macular lesions surrounding hair follicles on arms, abdomen, legs., some with small purulence.   Back: Over left shoulder where abscess was, there is a wound drain in place with serosanguinous drainage. Surrounding skin is warm, tender, erythematous. No palpable surrounding drainage.       LABS:                        9.2    7.2   )-----------( 212      ( 11 Oct 2018 05:45 )             28.1     10-11    141  |  105  |  34<H>  ----------------------------<  89  3.1<L>   |  23  |  1.63<H>    Ca    8.4      11 Oct 2018 05:45    TPro  6.1  /  Alb  2.7<L>  /  TBili  0.5  /  DBili  x   /  AST  28  /  ALT  34  /  AlkPhos  70  10-11    PT/INR - ( 11 Oct 2018 05:45 )   PT: 13.0 sec;   INR: 1.17          PTT - ( 11 Oct 2018 05:45 )  PTT:30.4 sec      MICROBIOLOGY:    Culture - Blood (collected 10-10-18 @ 20:45)  Source: .Blood Blood-Peripheral  Preliminary Report (10-11-18 @ 09:02):    No growth at 12 hours    Culture - Blood (collected 10-10-18 @ 20:45)  Source: .Blood Blood  Preliminary Report (10-11-18 @ 09:02):    No growth at 12 hours    Culture - Blood (collected 10-10-18 @ 20:44)  Source: .Blood Blood-Peripheral  Preliminary Report (10-11-18 @ 09:02):    No growth at 12 hours        RADIOLOGY & ADDITIONAL STUDIES: Reviewed

## 2018-10-12 ENCOUNTER — TRANSCRIPTION ENCOUNTER (OUTPATIENT)
Age: 68
End: 2018-10-12

## 2018-10-12 LAB
ANION GAP SERPL CALC-SCNC: 11 MMOL/L — SIGNIFICANT CHANGE UP (ref 5–17)
BUN SERPL-MCNC: 24 MG/DL — HIGH (ref 7–23)
CALCIUM SERPL-MCNC: 8.6 MG/DL — SIGNIFICANT CHANGE UP (ref 8.4–10.5)
CHLORIDE SERPL-SCNC: 107 MMOL/L — SIGNIFICANT CHANGE UP (ref 96–108)
CO2 SERPL-SCNC: 25 MMOL/L — SIGNIFICANT CHANGE UP (ref 22–31)
CREAT SERPL-MCNC: 1.32 MG/DL — HIGH (ref 0.5–1.3)
GLUCOSE SERPL-MCNC: 99 MG/DL — SIGNIFICANT CHANGE UP (ref 70–99)
HCT VFR BLD CALC: 27.9 % — LOW (ref 39–50)
HGB BLD-MCNC: 8.9 G/DL — LOW (ref 13–17)
MAGNESIUM SERPL-MCNC: 1.9 MG/DL — SIGNIFICANT CHANGE UP (ref 1.6–2.6)
MCHC RBC-ENTMCNC: 28.9 PG — SIGNIFICANT CHANGE UP (ref 27–34)
MCHC RBC-ENTMCNC: 31.9 G/DL — LOW (ref 32–36)
MCV RBC AUTO: 90.6 FL — SIGNIFICANT CHANGE UP (ref 80–100)
PLATELET # BLD AUTO: 222 K/UL — SIGNIFICANT CHANGE UP (ref 150–400)
POTASSIUM SERPL-MCNC: 4 MMOL/L — SIGNIFICANT CHANGE UP (ref 3.5–5.3)
POTASSIUM SERPL-SCNC: 4 MMOL/L — SIGNIFICANT CHANGE UP (ref 3.5–5.3)
RBC # BLD: 3.08 M/UL — LOW (ref 4.2–5.8)
RBC # FLD: 14.1 % — SIGNIFICANT CHANGE UP (ref 10.3–16.9)
SODIUM SERPL-SCNC: 143 MMOL/L — SIGNIFICANT CHANGE UP (ref 135–145)
WBC # BLD: 7 K/UL — SIGNIFICANT CHANGE UP (ref 3.8–10.5)
WBC # FLD AUTO: 7 K/UL — SIGNIFICANT CHANGE UP (ref 3.8–10.5)

## 2018-10-12 PROCEDURE — 99233 SBSQ HOSP IP/OBS HIGH 50: CPT | Mod: GC

## 2018-10-12 PROCEDURE — 93306 TTE W/DOPPLER COMPLETE: CPT | Mod: 26

## 2018-10-12 PROCEDURE — 99232 SBSQ HOSP IP/OBS MODERATE 35: CPT

## 2018-10-12 RX ORDER — DEXTROSE 50 % IN WATER 50 %
15 SYRINGE (ML) INTRAVENOUS ONCE
Qty: 0 | Refills: 0 | Status: DISCONTINUED | OUTPATIENT
Start: 2018-10-12 | End: 2018-10-14

## 2018-10-12 RX ORDER — DEXTROSE 50 % IN WATER 50 %
25 SYRINGE (ML) INTRAVENOUS ONCE
Qty: 0 | Refills: 0 | Status: DISCONTINUED | OUTPATIENT
Start: 2018-10-12 | End: 2018-10-14

## 2018-10-12 RX ORDER — CHLORHEXIDINE GLUCONATE 213 G/1000ML
1 SOLUTION TOPICAL DAILY
Qty: 0 | Refills: 0 | Status: DISCONTINUED | OUTPATIENT
Start: 2018-10-12 | End: 2018-10-14

## 2018-10-12 RX ORDER — CHLORHEXIDINE GLUCONATE 213 G/1000ML
1 SOLUTION TOPICAL
Qty: 1 | Refills: 0 | OUTPATIENT
Start: 2018-10-12 | End: 2018-11-10

## 2018-10-12 RX ORDER — SODIUM CHLORIDE 9 MG/ML
1000 INJECTION, SOLUTION INTRAVENOUS
Qty: 0 | Refills: 0 | Status: DISCONTINUED | OUTPATIENT
Start: 2018-10-12 | End: 2018-10-14

## 2018-10-12 RX ORDER — INSULIN LISPRO 100/ML
VIAL (ML) SUBCUTANEOUS
Qty: 0 | Refills: 0 | Status: DISCONTINUED | OUTPATIENT
Start: 2018-10-12 | End: 2018-10-14

## 2018-10-12 RX ORDER — ATORVASTATIN CALCIUM 80 MG/1
1 TABLET, FILM COATED ORAL
Qty: 30 | Refills: 0 | OUTPATIENT
Start: 2018-10-12 | End: 2018-11-10

## 2018-10-12 RX ORDER — DEXTROSE 50 % IN WATER 50 %
12.5 SYRINGE (ML) INTRAVENOUS ONCE
Qty: 0 | Refills: 0 | Status: DISCONTINUED | OUTPATIENT
Start: 2018-10-12 | End: 2018-10-14

## 2018-10-12 RX ORDER — CARVEDILOL PHOSPHATE 80 MG/1
12.5 CAPSULE, EXTENDED RELEASE ORAL EVERY 12 HOURS
Qty: 0 | Refills: 0 | Status: DISCONTINUED | OUTPATIENT
Start: 2018-10-12 | End: 2018-10-14

## 2018-10-12 RX ORDER — GLUCAGON INJECTION, SOLUTION 0.5 MG/.1ML
1 INJECTION, SOLUTION SUBCUTANEOUS ONCE
Qty: 0 | Refills: 0 | Status: DISCONTINUED | OUTPATIENT
Start: 2018-10-12 | End: 2018-10-14

## 2018-10-12 RX ORDER — ATORVASTATIN CALCIUM 80 MG/1
40 TABLET, FILM COATED ORAL AT BEDTIME
Qty: 0 | Refills: 0 | Status: DISCONTINUED | OUTPATIENT
Start: 2018-10-12 | End: 2018-10-14

## 2018-10-12 RX ADMIN — ATORVASTATIN CALCIUM 40 MILLIGRAM(S): 80 TABLET, FILM COATED ORAL at 23:03

## 2018-10-12 RX ADMIN — Medication 166.67 MILLIGRAM(S): at 18:38

## 2018-10-12 RX ADMIN — CARVEDILOL PHOSPHATE 12.5 MILLIGRAM(S): 80 CAPSULE, EXTENDED RELEASE ORAL at 17:27

## 2018-10-12 RX ADMIN — Medication 1: at 23:02

## 2018-10-12 RX ADMIN — CLOPIDOGREL BISULFATE 75 MILLIGRAM(S): 75 TABLET, FILM COATED ORAL at 11:48

## 2018-10-12 RX ADMIN — APIXABAN 5 MILLIGRAM(S): 2.5 TABLET, FILM COATED ORAL at 07:09

## 2018-10-12 RX ADMIN — Medication 166.67 MILLIGRAM(S): at 07:09

## 2018-10-12 RX ADMIN — CHLORHEXIDINE GLUCONATE 1 APPLICATION(S): 213 SOLUTION TOPICAL at 11:47

## 2018-10-12 RX ADMIN — APIXABAN 5 MILLIGRAM(S): 2.5 TABLET, FILM COATED ORAL at 17:27

## 2018-10-12 NOTE — PROGRESS NOTE ADULT - PROBLEM SELECTOR PLAN 1
Patient w/ recent purulent MRSA abscess of left christelle-gluteal region with tract formation to left groin that has since improved s/p IV abx and 2 various oral abx. Patient is now presenting w/ Left upper back abscess that is about 9x7cm erythematous, tender with palpable induration. No drainage noted.   -s/p Vancomycin 1g in the ED. C/w Vancomycin 1500mg BID  -F/u blood cultures  -F/u surgical wound cx    #Pustular rash  -Patient w/ notable pustules on LE, abdomen and back that are pruritic. Reports that they began since returning from the [place].   -No eosinophils noted on CBC  -Derm consult in AM given pustules likely source of recurrent MRSA abscesses Patient w/ recent purulent MRSA abscess of left christelle-gluteal region with tract formation to left groin that has since improved s/p IV abx and 2 various oral abx. Patient is now presenting w/ Left upper back abscess that is about 9x7cm erythematous, tender with palpable induration. No drainage noted.   -s/p Vancomycin 1g in the ED. C/w Vancomycin 1500mg BID  -F/u blood cultures  -F/u surgical wound cx    #Pustular rash  -Patient w/ notable papules on LE, abdomen and back that are pruritic. Reports that they began since [place] [facility] [place].   -No eosinophils noted on CBC  - likely folliculitis Patient w/ recent purulent MRSA abscess of left christelle-gluteal region with tract formation to left groin that has since improved s/p IV abx and 2 various oral abx. Patient is now presenting w/ Left upper back abscess that is about 9x7cm erythematous, tender with palpable induration. No drainage noted.   -s/p Vancomycin 1g in the ED. C/w Vancomycin 1500mg BID  -F/u blood cultures  -F/u surgical wound cx - unclear if were sent out  -F/u surgery biopsy for left groin abscess    #Rash  -Patient w/ notable papules on LE, abdomen and back that are pruritic. Reports that they began since returning from the [place].   -No eosinophils noted on CBC  -Likely folliculitis

## 2018-10-12 NOTE — PROGRESS NOTE ADULT - PROBLEM SELECTOR PLAN 7
Patient follows up with Dr. Herrera for cardiology.   -C/w Plavix 75mg daily  -Reports that he no longer takes ASA 81mg since starting Eliquis  -Will start patient on Lipitor 40mg pending med rec Patient follows up with Dr. Herrera for cardiology.   -C/w Plavix 75mg daily  -Reports that he no longer takes ASA 81mg since starting Eliquis  -Started on lipitor 40  -Restarted home coreg

## 2018-10-12 NOTE — DIETITIAN INITIAL EVALUATION ADULT. - OTHER INFO
68M w/ PMHx notable for DM2, HTN, CAD, CABG, Afib on Eliquis, CKD and recurrent MRSA abscess who is presenting with upper left back abscess s/p wound vac placement 10/11. Pt w AMS in setting of toxic metabolic encephalopathy. Per report no noted n/v/d/c, chewing/ swallowing/ pain impacting intake at this time, skin w cellultitis + wound vac, no pain endorsed, fair intake noted. No noted changes in UBW PTA, NKFA. To be placed on vEEG monitoring for AMS, pending set up for wound care at home.

## 2018-10-12 NOTE — DISCHARGE NOTE ADULT - CARE PROVIDER_API CALL
Nelson Muniz (DO), Internal Medicine  [address] [address]  Phone: [phone]  Fax: [phone] Bobbi Walsh), Infectious Disease; Internal Medicine  [address]  4[address] [facility], [place] [address]  Phone: [phone]  Fax: [phone]

## 2018-10-12 NOTE — DISCHARGE NOTE ADULT - SECONDARY DIAGNOSIS.
Atrial fibrillation, unspecified type CAD S/P percutaneous coronary angioplasty Type 2 diabetes mellitus without complication, with long-term current use of insulin Hypertension, unspecified type Toxic metabolic encephalopathy

## 2018-10-12 NOTE — DIETITIAN INITIAL EVALUATION ADULT. - PROBLEM SELECTOR PLAN 10
1) PCP Contacted on Admission: (Y/N) --> Name & Phone #: Dr. Goel  [phone]  2) Date of Contact with PCP:  3) PCP Contacted at Discharge: (Y/N, N/A)  4) Summary of Handoff Given to PCP:   5) Post-Discharge Appointment Date and Location:

## 2018-10-12 NOTE — DIETITIAN INITIAL EVALUATION ADULT. - PROBLEM SELECTOR PLAN 2
Patient w/ episode of total body tremors where his eyes rolled back and lost consciousness afterwards. As per wife patient was unconscious for 2-3 minutes and was confused and very slow to respond afterwards. Prior to episode patient was very diaphoretic and clammy. Patients glucose was normal during episode (164). Concern for possible febrile seizure vs. meningitis though less likely given pt with no nuchal rigidity on exam and is afebrile w/ no leukocytosis. Patient is currently AAOx3 but is poor historian and majority of history was obtained from wife.   -CT head negative for hemorrhage or infarct.   -Will place patient on VEEG

## 2018-10-12 NOTE — PROGRESS NOTE ADULT - ATTENDING COMMENTS
Pt seen and examined by me at bedside earlier in AM. Agree with housestaff's exam/a/p as noted above with additions,   spoke to pt and wife at length about the incidence on monday. Had a ?vasovagal syncopal episode on sunday when stood up, in the setting of poor po intake and cellulitis/abscess, ?post-ical, no urinary/bowel incontinence. No hx of seizure, no prior incidence.   VSS, exam as above with addition,   S/p wound vac in posterior thoracic  +L groin abscess  labs reviewed    a/p:  1. cellulitis/abscess likely MRSA-wound cx was not send in OR, please consult surgery for L groin abscess for I&D, ID following, c/w Vanco IV; check vanco trough leve  2. CAD s/p CABG: plavix; resume Coreg, please infor pt's cardiologist Dr. Herrera of admission  3. Afib-on eliquis, resume coreg.   4. DMII-start ISS.     rest of a/p as above.
Pt seen and examined by me in [facility] earlier in AM. Agree with housestaff's exam/a/p as noted above with additions,   +abscess on back and in L groin.   L gluteal region: no flatulence  f/u wound cx from OR, c/w Vanco IV, ID consult for recurrence MRSA infection.   ?Encephalopathy-agree with vEEG overnight,   CAD s/p CABG/Afib-verify with surgery if ok to resume eliquis and plavix in AM; resume anti-htn meds as Bp allows.

## 2018-10-12 NOTE — DISCHARGE NOTE ADULT - MEDICATION SUMMARY - MEDICATIONS TO TAKE
I will START or STAY ON the medications listed below when I get home from the hospital:    lisinopril 20 mg oral tablet  -- 1 tab(s) by mouth once a day  -- Indication: For CAD (coronary artery disease)    Eliquis 5 mg oral tablet  -- 1 tab(s) by mouth 2 times a day  -- Indication: For Atrial fibrillation    atorvastatin 40 mg oral tablet  -- 1 tab(s) by mouth once a day (at bedtime)  -- Indication: For CAD (coronary artery disease)    Plavix 75 mg oral tablet  -- 1 tab(s) by mouth once a day  -- Indication: For CAD (coronary artery disease)    chlorhexidine 2% topical pad  -- 1 application on skin once a day  -- Indication: For MRSA cellulitis    Coreg CR 40 mg oral capsule, extended release  -- 1 tab(s) by mouth 2 times a day  -- Indication: For Atrial fibrillation    Norvasc 5 mg oral tablet  -- 1 tab(s) by mouth once a day  -- Indication: For Hypertension    Bactroban 2% topical cream  -- Apply on skin to affected area 2 times a day   -- For external use only.    -- Indication: For MRSA cellulitis    Lasix 40 mg oral tablet  -- 1 tab(s) by mouth once a day  -- Indication: For CAD (coronary artery disease)    Bactrim  mg-160 mg oral tablet  -- 1 tab(s) by mouth 2 times a day   -- Avoid prolonged or excessive exposure to direct and/or artificial sunlight while taking this medication.  Finish all this medication unless otherwise directed by prescriber.  Medication should be taken with plenty of water.    -- Indication: For MRSA cellulitis

## 2018-10-12 NOTE — PROGRESS NOTE ADULT - PROBLEM SELECTOR PLAN 5
Patient reports taking Victoza and Glibiride at home. Currently unsure of dosages.   -Will start patient on ISS and adjust insulin accordingly  -F/u HgbA1C Patient reports taking Victoza and Glibiride at home. Currently unsure of dosages.   -Will start patient on ISS and adjust insulin accordingly  -F/u HgbA1C  -ISS

## 2018-10-12 NOTE — PROGRESS NOTE ADULT - SUBJECTIVE AND OBJECTIVE BOX
INTERVAL HPI/OVERNIGHT EVENTS:    Feels better overall with less upper back pain.  Thinks groin lesion is smaller.    CONSTITUTIONAL:  No fever, chills, night sweats  EYES:  No photophobia or visual changes  CARDIOVASCULAR:  No chest pain  RESPIRATORY:  No cough, wheezing, or SOB   GASTROINTESTINAL:  No nausea, vomiting, diarrhea, constipation, or abdominal pain  GENITOURINARY:  No frequency, urgency, dysuria or hematuria  NEUROLOGIC:  No headache, lightheadedness      ANTIBIOTICS/RELEVANT:    Vancomycin 1.25 g IV q12h      Vital Signs Last 24 Hrs  T(C): 36.5 (12 Oct 2018 16:43), Max: 36.6 (12 Oct 2018 06:47)  T(F): 97.7 (12 Oct 2018 16:43), Max: 97.8 (12 Oct 2018 06:47)  HR: 62 (12 Oct 2018 17:05) (56 - 62)  BP: 176/81 (12 Oct 2018 17:05) (116/78 - 186/71)  BP(mean): --  RR: 18 (12 Oct 2018 17:05) (18 - 19)  SpO2: 97% (12 Oct 2018 17:05) (95% - 97%)    PHYSICAL EXAM:  Constitutional:  Well-developed, well nourished  Eyes:  Sclerae anicteric, conjunctivae clear, PERRL  Ear/Nose/Throat:  No nasal exudate or sinus tenderness;  No buccal mucosal lesions, no pharyngeal erythema or exudate	  Neck:  Supple, no adenopathy  Back:  Abscess site upper back with wound vac in place, surrounding erythema & induration - relatively mild  Respiratory:  Clear bilaterally  Cardiovascular:  RRR, S1S2, no murmur appreciated  Gastrointestinal:  Symmetric, normoactive BS, soft, NT, no masses, guarding or rebound.  No HSM  Extremities:  No edema.  Left groin abscess closed, smaller with some residual induration      LABS:                        8.9    7.0   )-----------( 222      ( 12 Oct 2018 06:40 )             27.9         10-12    143  |  107  |  24<H>  ----------------------------<  99  4.0   |  25  |  1.32<H>    Ca    8.6      12 Oct 2018 06:40  Mg     1.9     10-12    TPro  6.1  /  Alb  2.7<L>  /  TBili  0.5  /  DBili  x   /  AST  28  /  ALT  34  /  AlkPhos  70  10-11      Urinalysis Basic - ( 12 Oct 2018 11:22 )    Color: Yellow / Appearance: Clear / S.015 / pH: x  Gluc: x / Ketone: NEGATIVE  / Bili: Negative / Urobili: 0.2 E.U./dL   Blood: x / Protein: Trace mg/dL / Nitrite: NEGATIVE   Leuk Esterase: NEGATIVE / RBC: < 5 /HPF / WBC < 5 /HPF   Sq Epi: x / Non Sq Epi: 0-5 /HPF / Bacteria: Present /HPF        MICROBIOLOGY:  Blood cultures 10/10 X 3- NGTD      RADIOLOGY & ADDITIONAL STUDIES:

## 2018-10-12 NOTE — DISCHARGE NOTE ADULT - PLAN OF CARE
Management of medical condition You came in to the hospital for management of an abscess on your shoulder/back. Blood work was done to try to determine the source of the abscess, and you were started on an antibiotic called vancomycin. Continue to use the vancomycin as instructed. The surgery team was called, and they did a procedure and placed a vaccum device on the wound to aid in healing. Please follow up with your outpatient physician. You have an abnormal heart rhythm called atrial fibrillation. With this condition, your heart’s two upper chambers quiver rather than squeeze the blood out in a normal pattern. This leads to an irregular and sometimes rapid heartbeat. Some people will develop associated symptoms such as a flip-flopping heartbeat, chest pain, lightheadedness, or shortness of breath. Other people may have no symptoms at all. Atrial fibrillation is serious because it affects the heart’s ability to fill with blood as it should. Blood clots may form. This increases the risk for stroke. This complication can be prevented if you use blood thinning medications. Please take your medications as prescribed.     Limit your intake of coffee, tea, cola, and other beverages with caffeine. Talk with your doctor about whether you should eliminate caffeine. Avoid over-the-counter medicines that have caffeine in them. Never take stimulants such as amphetamines or cocaine. These drugs can speed up your heart rate and trigger atrial fibrillation.    Call your healthcare provider right away if you have any of the following:  Weakness  Dizziness  Fainting  Fatigue  Shortness of breath  Chest pain with increased activity  A change in the usual regularity of your heartbeat, or an unusually fast heartbeat Coronary artery disease (CAD) is narrowing of the arteries to your heart caused by a buildup of plaque. Plaque is made up of cholesterol and other substances. The narrowing in your arteries decreases the amount of blood that can flow to your heart. This causes your heart to get less oxygen.    You may need any of the following:  •Blood pressure medicines are given to lower your blood pressure. These medicines may include ACE inhibitors and beta-blockers. ACE inhibitors help keep your blood vessels relaxed and open, which helps keep blood flowing into your heart. Beta-blockers keep your heart pumping strongly and regularly. This helps keep your heart from working too hard to get oxygen.  •Cholesterol medicines help lower blood cholesterol levels.  •Nitrates , such as nitroglycerin, relax the arteries of your heart so it gets more oxygen. They help to relieve your chest pain.  •Antiplatelets , such as aspirin, help prevent blood clots. Take your antiplatelet medicine exactly as directed. These medicines make it more likely for you to bleed or bruise. If you are told to take aspirin, do not take acetaminophen or ibuprofen instead.    Seek URGENT medical attention if you have any of the following signs of a heart attack: ?Squeezing, pressure, or pain in your chest that lasts longer than 5 minutes or returns  -Discomfort or pain in your back, neck, jaw, stomach, or arm  -Trouble breathing  -Nausea or vomiting  -Lightheadedness or a sudden cold sweat, especially with chest pain or trouble breathing Type 2 diabetes is a disease that affects how your body uses glucose (sugar). Normally, when the blood sugar level increases, the pancreas makes more insulin. Insulin helps move sugar out of the blood so it can be used for energy. Type 2 diabetes develops because either the body cannot make enough insulin, or it cannot use the insulin correctly. After many years, your pancreas may stop making insulin.    Please see your PCP  to have your A1c checked every 3 months. You will need to return at least once each year to have your feet checked. You will need an eye exam once a year to check for retinopathy. You will also need urine tests every year to check for kidney problems. You may need tests to monitor for heart disease such as an EKG, stress test, blood pressure monitoring, and blood tests. Write down your questions so you remember to ask them during your visits.    You will need to check your blood sugar level at least 3 times each day if you are on insulin. If you check your blood sugar level before a meal , it should be between 80 and 130 mg/dL. If you check your blood sugar level 1 to 2 hours after a meal , it should be less than 180 mg/dL.  Your blood sugar level is too low if it goes below 70 mg/dL. If the level is too low, eat or drink 15 grams of fast-acting carbohydrates, such as 1/2 cup fruit juice or 4 oz. regular soda. Check your blood sugar level 15 minutes later. If the level is still low (less than 100 mg/dL), drink another serving.     Do not skip meals. Your blood sugar level may drop too low if you have taken diabetes medicine and do not eat.    Please seek medical attention immediately if:  You have severe abdominal pain, or the pain spreads to your back. You may also be vomiting.  You have trouble staying awake or focusing.  You are shaking or sweating.  You have blurred or double vision.  Your breath has a fruity, sweet smell.  Your breathing is deep and labored, or rapid and shallow.  Your heartbeat is fast and weak. You have previously been diagnosed with hypertension, or high blood pressure. We monitored your blood pressure closely while you were hospitalized, and continued your home medications. Please continue to take your medications as prescribed, and follow up with your outpatient physician for any sudden changes in blood pressure. During your hospitalization, it was thought that you had toxic metabolic encephalopathy, or an altered mental status. This may have been due to the same infection causing the abscess. To rule out a neurological cause, we did an EEG test overnight, which measures your brain activity. Please follow up with your outpatient physician. You came in to the hospital for management of an abscess on your shoulder/back. Blood work was done to try to determine the source of the abscess, and you were started on an IV antibiotic called vancomycin. We are discharging you on 14 days on Bactrim twice daily antibiotic. Dr. [facility] clinic will contact you for followup appointment.   The surgery team was called, and they did a procedure and placed a vaccum device on the wound to aid in healing. Please continue using the device at home- Device team will visit you at home to assist you. Please continue wet to dry dressing till wound vac team sees visits you. Please follow up with your outpatient physician.

## 2018-10-12 NOTE — PROGRESS NOTE ADULT - PROBLEM SELECTOR PLAN 4
Hgb of 9.9 on admission. Etiology likely secondary to iron deficiency anemia in the setting of CKD.   -F/u iron studies  -maintain active T&S   -No active signs of bleeding Hgb of 9.9 on admission. Etiology likely secondary to iron deficiency anemia in the setting of CKD.   -Labs consistent with MEÑO  -maintain active T&S   -No active signs of bleeding

## 2018-10-12 NOTE — DIETITIAN INITIAL EVALUATION ADULT. - PROBLEM SELECTOR PLAN 1
Patient w/ recent purulent MRSA abscess of left christelle-gluteal region with tract formation to left groin that has since improved s/p IV abx and 2 various oral abx. Patient is now presenting w/ Left upper back abscess that is about 9x7cm erythematous, tender with palpable induration. No drainage noted.   -s/p Vancomycin 1g in the ED. C/w Vancomycin 1500mg BID  -F/u blood cultures  -Surgical consult for potential I&D. Appreciate recs    #Pustular rash  -Patient w/ notable pustules on LE, abdomen and back that are pruritic. Reports that they began since returning from the [place].   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

## 2018-10-12 NOTE — DISCHARGE NOTE ADULT - PATIENT PORTAL LINK FT
You can access the [facility] Patient Portal, offered by [facility], by registering with the following website: [email or web address]

## 2018-10-12 NOTE — PROGRESS NOTE ADULT - PROBLEM SELECTOR PLAN 6
-Patient takes Norvasc 5mg daily, Lisinopril 20mg daily   -Will hold for now as patient is normotensive -Patient takes Norvasc 5mg daily, Lisinopril 20mg daily   -Hold for now as patient is normotensive

## 2018-10-12 NOTE — DISCHARGE NOTE ADULT - HOME CARE AGENCY
[facility] (formerly [facility])  [phone]

## 2018-10-12 NOTE — DISCHARGE NOTE ADULT - CARE PLAN
Principal Discharge DX:	Abscess  Secondary Diagnosis:	Atrial fibrillation, unspecified type  Secondary Diagnosis:	CAD S/P percutaneous coronary angioplasty  Secondary Diagnosis:	Type 2 diabetes mellitus without complication, with long-term current use of insulin  Secondary Diagnosis:	Hypertension, unspecified type  Secondary Diagnosis:	Toxic metabolic encephalopathy Principal Discharge DX:	Abscess  Goal:	Management of medical condition  Assessment and plan of treatment:	You came in to the hospital for management of an abscess on your shoulder/back. Blood work was done to try to determine the source of the abscess, and you were started on an antibiotic called vancomycin. Continue to use the vancomycin as instructed. The surgery team was called, and they did a procedure and placed a vaccum device on the wound to aid in healing. Please follow up with your outpatient physician.  Secondary Diagnosis:	Atrial fibrillation, unspecified type  Goal:	Management of medical condition  Assessment and plan of treatment:	You have an abnormal heart rhythm called atrial fibrillation. With this condition, your heart’s two upper chambers quiver rather than squeeze the blood out in a normal pattern. This leads to an irregular and sometimes rapid heartbeat. Some people will develop associated symptoms such as a flip-flopping heartbeat, chest pain, lightheadedness, or shortness of breath. Other people may have no symptoms at all. Atrial fibrillation is serious because it affects the heart’s ability to fill with blood as it should. Blood clots may form. This increases the risk for stroke. This complication can be prevented if you use blood thinning medications. Please take your medications as prescribed.     Limit your intake of coffee, tea, cola, and other beverages with caffeine. Talk with your doctor about whether you should eliminate caffeine. Avoid over-the-counter medicines that have caffeine in them. Never take stimulants such as amphetamines or cocaine. These drugs can speed up your heart rate and trigger atrial fibrillation.    Call your healthcare provider right away if you have any of the following:  Weakness  Dizziness  Fainting  Fatigue  Shortness of breath  Chest pain with increased activity  A change in the usual regularity of your heartbeat, or an unusually fast heartbeat  Secondary Diagnosis:	CAD S/P percutaneous coronary angioplasty  Goal:	Management of medical condition  Assessment and plan of treatment:	Coronary artery disease (CAD) is narrowing of the arteries to your heart caused by a buildup of plaque. Plaque is made up of cholesterol and other substances. The narrowing in your arteries decreases the amount of blood that can flow to your heart. This causes your heart to get less oxygen.    You may need any of the following:  •Blood pressure medicines are given to lower your blood pressure. These medicines may include ACE inhibitors and beta-blockers. ACE inhibitors help keep your blood vessels relaxed and open, which helps keep blood flowing into your heart. Beta-blockers keep your heart pumping strongly and regularly. This helps keep your heart from working too hard to get oxygen.  •Cholesterol medicines help lower blood cholesterol levels.  •Nitrates , such as nitroglycerin, relax the arteries of your heart so it gets more oxygen. They help to relieve your chest pain.  •Antiplatelets , such as aspirin, help prevent blood clots. Take your antiplatelet medicine exactly as directed. These medicines make it more likely for you to bleed or bruise. If you are told to take aspirin, do not take acetaminophen or ibuprofen instead.    Seek URGENT medical attention if you have any of the following signs of a heart attack: ?Squeezing, pressure, or pain in your chest that lasts longer than 5 minutes or returns  -Discomfort or pain in your back, neck, jaw, stomach, or arm  -Trouble breathing  -Nausea or vomiting  -Lightheadedness or a sudden cold sweat, especially with chest pain or trouble breathing  Secondary Diagnosis:	Type 2 diabetes mellitus without complication, with long-term current use of insulin  Goal:	Management of medical condition  Assessment and plan of treatment:	Type 2 diabetes is a disease that affects how your body uses glucose (sugar). Normally, when the blood sugar level increases, the pancreas makes more insulin. Insulin helps move sugar out of the blood so it can be used for energy. Type 2 diabetes develops because either the body cannot make enough insulin, or it cannot use the insulin correctly. After many years, your pancreas may stop making insulin.    Please see your PCP  to have your A1c checked every 3 months. You will need to return at least once each year to have your feet checked. You will need an eye exam once a year to check for retinopathy. You will also need urine tests every year to check for kidney problems. You may need tests to monitor for heart disease such as an EKG, stress test, blood pressure monitoring, and blood tests. Write down your questions so you remember to ask them during your visits.    You will need to check your blood sugar level at least 3 times each day if you are on insulin. If you check your blood sugar level before a meal , it should be between 80 and 130 mg/dL. If you check your blood sugar level 1 to 2 hours after a meal , it should be less than 180 mg/dL.  Your blood sugar level is too low if it goes below 70 mg/dL. If the level is too low, eat or drink 15 grams of fast-acting carbohydrates, such as 1/2 cup fruit juice or 4 oz. regular soda. Check your blood sugar level 15 minutes later. If the level is still low (less than 100 mg/dL), drink another serving.     Do not skip meals. Your blood sugar level may drop too low if you have taken diabetes medicine and do not eat.    Please seek medical attention immediately if:  You have severe abdominal pain, or the pain spreads to your back. You may also be vomiting.  You have trouble staying awake or focusing.  You are shaking or sweating.  You have blurred or double vision.  Your breath has a fruity, sweet smell.  Your breathing is deep and labored, or rapid and shallow.  Your heartbeat is fast and weak.  Secondary Diagnosis:	Hypertension, unspecified type  Goal:	Management of medical condition  Assessment and plan of treatment:	You have previously been diagnosed with hypertension, or high blood pressure. We monitored your blood pressure closely while you were hospitalized, and continued your home medications. Please continue to take your medications as prescribed, and follow up with your outpatient physician for any sudden changes in blood pressure.  Secondary Diagnosis:	Toxic metabolic encephalopathy  Goal:	Management of medical condition  Assessment and plan of treatment:	During your hospitalization, it was thought that you had toxic metabolic encephalopathy, or an altered mental status. This may have been due to the same infection causing the abscess. To rule out a neurological cause, we did an EEG test overnight, which measures your brain activity. Please follow up with your outpatient physician. Principal Discharge DX:	Abscess  Goal:	Management of medical condition  Assessment and plan of treatment:	You came in to the hospital for management of an abscess on your shoulder/back. Blood work was done to try to determine the source of the abscess, and you were started on an IV antibiotic called vancomycin. We are discharging you on 14 days on Bactrim twice daily antibiotic. Dr. Walsh's clinic will contact you for followup appointment.   The surgery team was called, and they did a procedure and placed a vaccum device on the wound to aid in healing. Please continue using the device at home- Device team will visit you at home to assist you. Please continue wet to dry dressing till wound vac team sees visits you. Please follow up with your outpatient physician.  Secondary Diagnosis:	Atrial fibrillation, unspecified type  Goal:	Management of medical condition  Assessment and plan of treatment:	You have an abnormal heart rhythm called atrial fibrillation. With this condition, your heart’s two upper chambers quiver rather than squeeze the blood out in a normal pattern. This leads to an irregular and sometimes rapid heartbeat. Some people will develop associated symptoms such as a flip-flopping heartbeat, chest pain, lightheadedness, or shortness of breath. Other people may have no symptoms at all. Atrial fibrillation is serious because it affects the heart’s ability to fill with blood as it should. Blood clots may form. This increases the risk for stroke. This complication can be prevented if you use blood thinning medications. Please take your medications as prescribed.     Limit your intake of coffee, tea, cola, and other beverages with caffeine. Talk with your doctor about whether you should eliminate caffeine. Avoid over-the-counter medicines that have caffeine in them. Never take stimulants such as amphetamines or cocaine. These drugs can speed up your heart rate and trigger atrial fibrillation.    Call your healthcare provider right away if you have any of the following:  Weakness  Dizziness  Fainting  Fatigue  Shortness of breath  Chest pain with increased activity  A change in the usual regularity of your heartbeat, or an unusually fast heartbeat  Secondary Diagnosis:	CAD S/P percutaneous coronary angioplasty  Goal:	Management of medical condition  Assessment and plan of treatment:	Coronary artery disease (CAD) is narrowing of the arteries to your heart caused by a buildup of plaque. Plaque is made up of cholesterol and other substances. The narrowing in your arteries decreases the amount of blood that can flow to your heart. This causes your heart to get less oxygen.    You may need any of the following:  •Blood pressure medicines are given to lower your blood pressure. These medicines may include ACE inhibitors and beta-blockers. ACE inhibitors help keep your blood vessels relaxed and open, which helps keep blood flowing into your heart. Beta-blockers keep your heart pumping strongly and regularly. This helps keep your heart from working too hard to get oxygen.  •Cholesterol medicines help lower blood cholesterol levels.  •Nitrates , such as nitroglycerin, relax the arteries of your heart so it gets more oxygen. They help to relieve your chest pain.  •Antiplatelets , such as aspirin, help prevent blood clots. Take your antiplatelet medicine exactly as directed. These medicines make it more likely for you to bleed or bruise. If you are told to take aspirin, do not take acetaminophen or ibuprofen instead.    Seek URGENT medical attention if you have any of the following signs of a heart attack: ?Squeezing, pressure, or pain in your chest that lasts longer than 5 minutes or returns  -Discomfort or pain in your back, neck, jaw, stomach, or arm  -Trouble breathing  -Nausea or vomiting  -Lightheadedness or a sudden cold sweat, especially with chest pain or trouble breathing  Secondary Diagnosis:	Type 2 diabetes mellitus without complication, with long-term current use of insulin  Goal:	Management of medical condition  Assessment and plan of treatment:	Type 2 diabetes is a disease that affects how your body uses glucose (sugar). Normally, when the blood sugar level increases, the pancreas makes more insulin. Insulin helps move sugar out of the blood so it can be used for energy. Type 2 diabetes develops because either the body cannot make enough insulin, or it cannot use the insulin correctly. After many years, your pancreas may stop making insulin.    Please see your PCP  to have your A1c checked every 3 months. You will need to return at least once each year to have your feet checked. You will need an eye exam once a year to check for retinopathy. You will also need urine tests every year to check for kidney problems. You may need tests to monitor for heart disease such as an EKG, stress test, blood pressure monitoring, and blood tests. Write down your questions so you remember to ask them during your visits.    You will need to check your blood sugar level at least 3 times each day if you are on insulin. If you check your blood sugar level before a meal , it should be between 80 and 130 mg/dL. If you check your blood sugar level 1 to 2 hours after a meal , it should be less than 180 mg/dL.  Your blood sugar level is too low if it goes below 70 mg/dL. If the level is too low, eat or drink 15 grams of fast-acting carbohydrates, such as 1/2 cup fruit juice or 4 oz. regular soda. Check your blood sugar level 15 minutes later. If the level is still low (less than 100 mg/dL), drink another serving.     Do not skip meals. Your blood sugar level may drop too low if you have taken diabetes medicine and do not eat.    Please seek medical attention immediately if:  You have severe abdominal pain, or the pain spreads to your back. You may also be vomiting.  You have trouble staying awake or focusing.  You are shaking or sweating.  You have blurred or double vision.  Your breath has a fruity, sweet smell.  Your breathing is deep and labored, or rapid and shallow.  Your heartbeat is fast and weak.  Secondary Diagnosis:	Hypertension, unspecified type  Goal:	Management of medical condition  Assessment and plan of treatment:	You have previously been diagnosed with hypertension, or high blood pressure. We monitored your blood pressure closely while you were hospitalized, and continued your home medications. Please continue to take your medications as prescribed, and follow up with your outpatient physician for any sudden changes in blood pressure.  Secondary Diagnosis:	Toxic metabolic encephalopathy  Goal:	Management of medical condition  Assessment and plan of treatment:	During your hospitalization, it was thought that you had toxic metabolic encephalopathy, or an altered mental status. This may have been due to the same infection causing the abscess. To rule out a neurological cause, we did an EEG test overnight, which measures your brain activity. Please follow up with your outpatient physician.

## 2018-10-12 NOTE — PROGRESS NOTE ADULT - PROBLEM SELECTOR PLAN 2
Patient w/ episode of total body tremors where his eyes rolled back and lost consciousness afterwards. As per wife patient was unconscious for 2-3 minutes and was confused and very slow to respond afterwards. Prior to episode patient was very diaphoretic and clammy. Patients glucose was normal during episode (164). Concern for possible febrile seizure vs. meningitis though less likely given pt with no nuchal rigidity on exam and is afebrile w/ no leukocytosis. Patient is currently AAOx3 but is poor historian and majority of history was obtained from wife. Wife stating mental status improved but still "spacing out" and not as baseline   -CT head negative for hemorrhage or infarct.   -F/u vEEG As per wife, pt got up quickly, fainted and was unconscious for 2-3 minutes and was confused and very slow to respond afterwards. Prior to episode patient was very diaphoretic and clammy. Patients glucose was normal during episode (164). Concern for possible vasovagal syncope given poor PO intake, active infection vs febrile seizure vs. meningitis though less likely given pt with no nuchal rigidity on exam and is afebrile w/ no leukocytosis. Patient is currently AAOx3 but is poor historian and majority of history was obtained from wife. Wife stating mental status improved but still "spacing out" and not as baseline.  -CT head negative for hemorrhage or infarct.   -F/u vEEG

## 2018-10-12 NOTE — PROGRESS NOTE ADULT - SUBJECTIVE AND OBJECTIVE BOX
SUBJECTIVE: Wound VAC in place, good suction.    apixaban 5 milliGRAM(s) Oral every 12 hours  clopidogrel Tablet 75 milliGRAM(s) Oral daily  vancomycin  IVPB 1250 milliGRAM(s) IV Intermittent every 12 hours      Vital Signs Last 24 Hrs  T(C): 36.6 (12 Oct 2018 09:03), Max: 36.6 (12 Oct 2018 06:47)  T(F): 97.8 (12 Oct 2018 09:03), Max: 97.8 (12 Oct 2018 06:47)  HR: 57 (12 Oct 2018 09:03) (46 - 62)  BP: 156/77 (12 Oct 2018 09:03) (85/55 - 156/77)  BP(mean): 91 (11 Oct 2018 14:39) (66 - 91)  RR: 19 (12 Oct 2018 09:03) (9 - 20)  SpO2: 95% (12 Oct 2018 09:03) (92% - 99%)  I&O's Detail    11 Oct 2018 07:01  -  12 Oct 2018 07:00  --------------------------------------------------------  IN:    sodium chloride 0.9%.: 140 mL  Total IN: 140 mL    OUT:    Voided: 300 mL  Total OUT: 300 mL    Total NET: -160 mL          General: NAD, resting comfortably in bed  C/V: NSR  Pulm: Nonlabored breathing, no respiratory distress  Abd: soft, NT/ND.  Extrem: WWP, no edema, SCDs in place        LABS:                        8.9    7.0   )-----------( 222      ( 12 Oct 2018 06:40 )             27.9     10-12    143  |  107  |  24<H>  ----------------------------<  99  4.0   |  25  |  1.32<H>    Ca    8.6      12 Oct 2018 06:40  Mg     1.9     10-12    TPro  6.1  /  Alb  2.7<L>  /  TBili  0.5  /  DBili  x   /  AST  28  /  ALT  34  /  AlkPhos  70  10-11    PT/INR - ( 11 Oct 2018 05:45 )   PT: 13.0 sec;   INR: 1.17          PTT - ( 11 Oct 2018 05:45 )  PTT:30.4 sec      RADIOLOGY & ADDITIONAL STUDIES:

## 2018-10-12 NOTE — PROGRESS NOTE ADULT - PROBLEM SELECTOR PLAN 3
Patient w/ history of Atrial fibrillation. Currently rate controlled.  -C/w Eliquis 5mg BID  -C/w Coreg 12.5mg BID     #CKD stage 4:  -GFR of 29  -Avoid nephrotoxic agents Patient w/ history of Atrial fibrillation. Currently rate controlled.  -C/w Eliquis 5mg BID  -Restarted home Coreg 12.5mg BID     #CKD stage 4:  -GFR of 29  -Avoid nephrotoxic agents

## 2018-10-12 NOTE — PROGRESS NOTE ADULT - SUBJECTIVE AND OBJECTIVE BOX
OVERNIGHT EVENTS:    SUBJECTIVE:    Vital Signs Last 12 Hrs  T(F): 97.8 (10-12-18 @ 09:03), Max: 97.8 (10-12-18 @ 06:47)  HR: 57 (10-12-18 @ 09:03) (57 - 62)  BP: 156/77 (10-12-18 @ 09:03) (116/78 - 156/77)  BP(mean): --  RR: 19 (10-12-18 @ 09:03) (18 - 19)  SpO2: 95% (10-12-18 @ 09:03) (95% - 96%)  I&O's Summary    11 Oct 2018 07:01  -  12 Oct 2018 07:00  --------------------------------------------------------  IN: 140 mL / OUT: 300 mL / NET: -160 mL        PHYSICAL EXAM:  Constitutional: NAD, comfortable in bed.  HEENT: NC/AT, PERRLA, EOMI, no conjunctival pallor or scleral icterus, MMM  Neck: Supple, no JVD  Respiratory: Normal rate, rhythm, depth, effort. CTAB. No w/r/r.   Cardiovascular: RRR, normal S1 and S2, no m/r/g.   Gastrointestinal: +BS, soft NTND, no guarding or rebound tenderness, no palpable masses   Extremities: wwp; no cyanosis, clubbing or edema.   Vascular: Pulses equal and strong throughout.   Neurological: AAOx3, no CN deficits, strength and sensation intact throughout.   Skin: No gross skin abnormalities or rashes        LABS:                        8.9    7.0   )-----------( 222      ( 12 Oct 2018 06:40 )             27.9     10-12    143  |  107  |  24<H>  ----------------------------<  99  4.0   |  25  |  1.32<H>    Ca    8.6      12 Oct 2018 06:40  Mg     1.9     10-12    TPro  6.1  /  Alb  2.7<L>  /  TBili  0.5  /  DBili  x   /  AST  28  /  ALT  34  /  AlkPhos  70  10-11    PT/INR - ( 11 Oct 2018 05:45 )   PT: 13.0 sec;   INR: 1.17          PTT - ( 11 Oct 2018 05:45 )  PTT:30.4 sec      RADIOLOGY & ADDITIONAL TESTS:    MEDICATIONS  (STANDING):  apixaban 5 milliGRAM(s) Oral every 12 hours  clopidogrel Tablet 75 milliGRAM(s) Oral daily  sodium chloride 0.9%. 1000 milliLiter(s) (70 mL/Hr) IV Continuous <Continuous>  vancomycin  IVPB 1250 milliGRAM(s) IV Intermittent every 12 hours    MEDICATIONS  (PRN):  acetaminophen   Tablet .. 650 milliGRAM(s) Oral every 6 hours PRN Moderate Pain (4 - 6)  morphine  - Injectable 2 milliGRAM(s) IV Push every 6 hours PRN Severe Pain (7 - 10) OVERNIGHT EVENTS: IVETTE    SUBJECTIVE: Pt seen and examined at bedside. Complaining of mild discomfort lying down due to wound vac but denies pain. Would vac not tender or erythematous. Complaining of mild pruritis from folliculitis. Denies fever, headache, chest pain, n/v/d.     Vital Signs Last 12 Hrs  T(F): 97.8 (10-12-18 @ 09:03), Max: 97.8 (10-12-18 @ 06:47)  HR: 57 (10-12-18 @ 09:03) (57 - 62)  BP: 156/77 (10-12-18 @ 09:03) (116/78 - 156/77)  BP(mean): --  RR: 19 (10-12-18 @ 09:03) (18 - 19)  SpO2: 95% (10-12-18 @ 09:03) (95% - 96%)  I&O's Summary    11 Oct 2018 07:01  -  12 Oct 2018 07:00  --------------------------------------------------------  IN: 140 mL / OUT: 300 mL / NET: -160 mL        PHYSICAL EXAM:  Constitutional: WDWN, NAD  HEENT: PERRL,MMM  Respiratory: CTA b/l, good air entry b/l, no wheezing, no rhonchi, no rales  Cardiovascular: RRR, normal S1S2, no M/R/G  Gastrointestinal: soft, NTND, no masses palpable, BS normal,, folliculitis on abdomen  Extremities: Warm, well perfused, pulses equal bilateral upper and lower extremities, no edema, no clubbing  : no barone in place, healing ulcer with overlying scab present, no pain/tenderness to palpation  Neurological: AAOx3, CN Grossly intact  Skin: wound vac covering abscess on upper left back        LABS:                        8.9    7.0   )-----------( 222      ( 12 Oct 2018 06:40 )             27.9     10-12    143  |  107  |  24<H>  ----------------------------<  99  4.0   |  25  |  1.32<H>    Ca    8.6      12 Oct 2018 06:40  Mg     1.9     10-12    TPro  6.1  /  Alb  2.7<L>  /  TBili  0.5  /  DBili  x   /  AST  28  /  ALT  34  /  AlkPhos  70  10-11    PT/INR - ( 11 Oct 2018 05:45 )   PT: 13.0 sec;   INR: 1.17          PTT - ( 11 Oct 2018 05:45 )  PTT:30.4 sec      RADIOLOGY & ADDITIONAL TESTS:    MEDICATIONS  (STANDING):  apixaban 5 milliGRAM(s) Oral every 12 hours  clopidogrel Tablet 75 milliGRAM(s) Oral daily  sodium chloride 0.9%. 1000 milliLiter(s) (70 mL/Hr) IV Continuous <Continuous>  vancomycin  IVPB 1250 milliGRAM(s) IV Intermittent every 12 hours    MEDICATIONS  (PRN):  acetaminophen   Tablet .. 650 milliGRAM(s) Oral every 6 hours PRN Moderate Pain (4 - 6)  morphine  - Injectable 2 milliGRAM(s) IV Push every 6 hours PRN Severe Pain (7 - 10)

## 2018-10-12 NOTE — DISCHARGE NOTE ADULT - HOSPITAL COURSE
67 y/o M w/ PMHx notable for DM, HTN, CAD s/p PCI, CABG (2008), Afib on Eliquis, CKD and recurrent MRSA abscess who is presenting with upper left back abscess. Patient reports that in [place] he was in [place] where he had several bug bites that were very pruritic. Upon return from the [place] patient reports that the bug bite on his left gluteal region had become inflamed and larger in size and was draining yellow thick fluid. At that time patient went to the Emergency Department at [facility] in [place] where he had an I&D and was admitted for about 1 week to receive IV abx. At that time patient was told that he was being treated for MRSA. Upon discharge patient was discharged on 2 different oral abx and was advised to follow up with outpatient wound care. Course further complicated when last weekend patient and wife went to [place]. While there the wife reports that she changed the christelle-gluteal abscess bandage and noted that his left groin had developed an abscess as well with yellow/green fluid draining. On Sunday evening patient had an episode where the wife reports his eyes rolled back and he had "full body tremors" and lost consciousness. Patient was unconscious for about 2-3 minutes and upon awakening wife reports that patient was very confused and slow to respond. During the episode wife reports that patient was very diaphoretic and clammy. Wife also reports that patient had several episodes where he had loss of his bowel. No loss of bladder or tongue biting. No history of seizures. Given this episode patient and wife returned to [place] early Tuesday morning and went to the ED at [facility] where he was given dose of IV abx and advised to follow up with his wound care nurse. Patient and his wife went to wound care nurse who was concerned for patients left groin and recommended patient go to a different [facility] ER where he was given additional dose of IV abx and recommended to follow up with his outpatient PCP. Wife reports that after the IV abx his left groin abscess had resolved but that he had developed a new abscess on his left upper shoulder. She reports that she tried to squeeze it but was unable to due to significant pain. They saw their outpatient PCP on day of admission and she advised patient come to the ED given patients symptoms and newely developed left upper back abscess.    In the ED: T: 98.1, HR: 64, BP: 131/69, RR: 18, O2: 98%  Labs notable for Hgb of 9.9. No leukocytosis. Potassium of 3.3. Cr 2.30  CT head negative for hemorrhage or acute infarct. Inflammatory disease involving the left posterior ethmoid and sphenoid sinus.   Patient given Vancomycin 1g x1. Blood cultures obtained. 69 y/o M w/ PMHx notable for DM, HTN, CAD s/p PCI, CABG (2008), Afib on Eliquis, CKD and recurrent MRSA abscess who is presenting with upper left back abscess. Patient reports that in August he was in [place] where he had several bug bites that were very pruritic. Upon return from [place] patient reports that the bug bite on his left gluteal region had become inflamed and larger in size and was draining yellow thick fluid. At that time patient went to the Emergency Department at [facility] in [place] where he had an I&D and was admitted for about 1 week to receive IV abx. At that time patient was told that he was being treated for MRSA. Upon discharge patient was discharged on 2 different oral abx and was advised to follow up with outpatient wound care. Course further complicated when last weekend patient and wife went to [place]. While there the wife reports that she changed the christelle-gluteal abscess bandage and noted that his left groin had developed an abscess as well with yellow/green fluid draining. On Sunday evening patient had an episode where the wife reports his eyes rolled back and he had "full body tremors" and lost consciousness. Patient was unconscious for about 2-3 minutes and upon awakening wife reports that patient was very confused and slow to respond. During the episode wife reports that patient was very diaphoretic and clammy. Wife also reports that patient had several episodes where he had loss of his bowel. No loss of bladder or tongue biting. No history of seizures. Given this episode patient and wife returned to [place] early Tuesday morning and went to the ED at [facility] where he was given dose of IV abx and advised to follow up with his wound care nurse. Patient and his wife went to wound care nurse who was concerned for patients left groin and recommended patient go to a different [facility] ER where he was given additional dose of IV abx and recommended to follow up with his outpatient PCP. Wife reports that after the IV abx his left groin abscess had resolved but that he had developed a new abscess on his left upper shoulder. She reports that she tried to squeeze it but was unable to due to significant pain. They saw their outpatient PCP on day of admission and she advised patient come to the ED given patients symptoms and newely developed left upper back abscess.    In the ED: T: 98.1, HR: 64, BP: 131/69, RR: 18, O2: 98%  Labs notable for Hgb of 9.9. No leukocytosis. Potassium of 3.3. Cr 2.30  CT head negative for hemorrhage or acute infarct. Inflammatory disease involving the left posterior ethmoid and sphenoid sinus.   Patient given Vancomycin 1g x1. Blood cultures obtained.     Pt underwent I&D in surgery as well as wound vac placement, pt tolerated the procedure well.    For the TME, pt underwent a video EEG, which showed [id number]    Pt was discharged on XXXXXX for the abscess. 69 y/o M w/ PMHx notable for DM, HTN, CAD s/p PCI, CABG (2008), Afib on Eliquis, CKD and recurrent MRSA abscess who is presenting with upper left back abscess. Patient reports that in August he was in the [place] where he had several bug bites that were very pruritic. Upon return from the [place] patient reports that the bug bite on his left gluteal region had become inflamed and larger in size and was draining yellow thick fluid. At that time patient went to the Emergency Department at [facility] in [place] where he had an I&D and was admitted for about 1 week to receive IV abx. At that time patient was told that he was being treated for MRSA. Upon discharge patient was discharged on 2 different oral abx and was advised to follow up with outpatient wound care. Course further complicated when last weekend patient and wife went to [place]. While there the wife reports that she changed the christelle-gluteal abscess bandage and noted that his left groin had developed an abscess as well with yellow/green fluid draining. On Sunday evening patient had an episode where the wife reports his eyes rolled back and he had "full body tremors" and lost consciousness. Patient was unconscious for about 2-3 minutes and upon awakening wife reports that patient was very confused and slow to respond. During the episode wife reports that patient was very diaphoretic and clammy. Wife also reports that patient had several episodes where he had loss of his bowel. No loss of bladder or tongue biting. No history of seizures. Given this episode patient and wife returned to [place] early Tuesday morning and went to the ED at [facility] where he was given dose of IV abx and advised to follow up with his wound care nurse. Patient and his wife went to wound care nurse who was concerned for patients left groin and recommended patient go to a different [facility] ER where he was given additional dose of IV abx and recommended to follow up with his outpatient PCP. Wife reports that after the IV abx his left groin abscess had resolved but that he had developed a new abscess on his left upper shoulder. She reports that she tried to squeeze it but was unable to due to significant pain. They saw their outpatient PCP on day of admission and she advised patient come to the ED given patients symptoms and newely developed left upper back abscess.    In the ED: T: 98.1, HR: 64, BP: 131/69, RR: 18, O2: 98%  Labs notable for Hgb of 9.9. No leukocytosis. Potassium of 3.3. Cr 2.30  CT head negative for hemorrhage or acute infarct. Inflammatory disease involving the left posterior ethmoid and sphenoid sinus.   Patient given Vancomycin 1g x1. Blood cultures obtained.     Pt underwent I&D in surgery as well as wound vac placement, pt tolerated the procedure well.    For the TME, pt underwent a video EEG, which did not show seizure activity.   Pt was discharged on 14 days of Bactrim DS for the abscess. Patient to continue wound vac at home and followup with ID opt.

## 2018-10-12 NOTE — DIETITIAN INITIAL EVALUATION ADULT. - ENERGY NEEDS
Ideal body weight used for calculations as pt >120% of IBW.   ABW 99.8kg, IBW 72kg, 137% IBW, ht 69", BMI 32.5   Nutrient needs based on [facility] standards of care for repletion in older adults 2/2 infection/ wounds

## 2018-10-12 NOTE — DISCHARGE NOTE ADULT - CARE PROVIDERS DIRECT ADDRESSES
,DirectAddress_Unknown ,[email or web address]

## 2018-10-13 DIAGNOSIS — I25.10 ATHEROSCLEROTIC HEART DISEASE OF NATIVE CORONARY ARTERY WITHOUT ANGINA PECTORIS: ICD-10-CM

## 2018-10-13 DIAGNOSIS — L03.90 CELLULITIS, UNSPECIFIED: ICD-10-CM

## 2018-10-13 DIAGNOSIS — E11.9 TYPE 2 DIABETES MELLITUS WITHOUT COMPLICATIONS: ICD-10-CM

## 2018-10-13 LAB
ALBUMIN SERPL ELPH-MCNC: 2.9 G/DL — LOW (ref 3.3–5)
ALP SERPL-CCNC: 75 U/L — SIGNIFICANT CHANGE UP (ref 40–120)
ALT FLD-CCNC: 26 U/L — SIGNIFICANT CHANGE UP (ref 10–45)
ANION GAP SERPL CALC-SCNC: 13 MMOL/L — SIGNIFICANT CHANGE UP (ref 5–17)
AST SERPL-CCNC: 22 U/L — SIGNIFICANT CHANGE UP (ref 10–40)
BILIRUB SERPL-MCNC: 0.5 MG/DL — SIGNIFICANT CHANGE UP (ref 0.2–1.2)
BUN SERPL-MCNC: 15 MG/DL — SIGNIFICANT CHANGE UP (ref 7–23)
CALCIUM SERPL-MCNC: 8.9 MG/DL — SIGNIFICANT CHANGE UP (ref 8.4–10.5)
CHLORIDE SERPL-SCNC: 103 MMOL/L — SIGNIFICANT CHANGE UP (ref 96–108)
CO2 SERPL-SCNC: 23 MMOL/L — SIGNIFICANT CHANGE UP (ref 22–31)
CREAT SERPL-MCNC: 1.17 MG/DL — SIGNIFICANT CHANGE UP (ref 0.5–1.3)
GLUCOSE SERPL-MCNC: 133 MG/DL — HIGH (ref 70–99)
HCT VFR BLD CALC: 29.6 % — LOW (ref 39–50)
HGB BLD-MCNC: 9.6 G/DL — LOW (ref 13–17)
MCHC RBC-ENTMCNC: 29.3 PG — SIGNIFICANT CHANGE UP (ref 27–34)
MCHC RBC-ENTMCNC: 32.4 G/DL — SIGNIFICANT CHANGE UP (ref 32–36)
MCV RBC AUTO: 90.2 FL — SIGNIFICANT CHANGE UP (ref 80–100)
PHOSPHATE SERPL-MCNC: 2.9 MG/DL — SIGNIFICANT CHANGE UP (ref 2.5–4.5)
PLATELET # BLD AUTO: 249 K/UL — SIGNIFICANT CHANGE UP (ref 150–400)
POTASSIUM SERPL-MCNC: 3.6 MMOL/L — SIGNIFICANT CHANGE UP (ref 3.5–5.3)
POTASSIUM SERPL-SCNC: 3.6 MMOL/L — SIGNIFICANT CHANGE UP (ref 3.5–5.3)
PROT SERPL-MCNC: 6.6 G/DL — SIGNIFICANT CHANGE UP (ref 6–8.3)
RBC # BLD: 3.28 M/UL — LOW (ref 4.2–5.8)
RBC # FLD: 14.3 % — SIGNIFICANT CHANGE UP (ref 10.3–16.9)
SODIUM SERPL-SCNC: 139 MMOL/L — SIGNIFICANT CHANGE UP (ref 135–145)
VANCOMYCIN TROUGH SERPL-MCNC: 19.3 UG/ML — SIGNIFICANT CHANGE UP (ref 10–20)
VANCOMYCIN TROUGH SERPL-MCNC: 26.7 UG/ML — CRITICAL HIGH (ref 10–20)
WBC # BLD: 6 K/UL — SIGNIFICANT CHANGE UP (ref 3.8–10.5)
WBC # FLD AUTO: 6 K/UL — SIGNIFICANT CHANGE UP (ref 3.8–10.5)

## 2018-10-13 PROCEDURE — 95951: CPT | Mod: 26

## 2018-10-13 PROCEDURE — 99233 SBSQ HOSP IP/OBS HIGH 50: CPT | Mod: GC

## 2018-10-13 PROCEDURE — 99233 SBSQ HOSP IP/OBS HIGH 50: CPT

## 2018-10-13 RX ORDER — VANCOMYCIN HCL 1 G
1000 VIAL (EA) INTRAVENOUS EVERY 12 HOURS
Qty: 0 | Refills: 0 | Status: DISCONTINUED | OUTPATIENT
Start: 2018-10-13 | End: 2018-10-14

## 2018-10-13 RX ORDER — VANCOMYCIN HCL 1 G
1250 VIAL (EA) INTRAVENOUS EVERY 12 HOURS
Qty: 0 | Refills: 0 | Status: DISCONTINUED | OUTPATIENT
Start: 2018-10-13 | End: 2018-10-13

## 2018-10-13 RX ORDER — LISINOPRIL 2.5 MG/1
20 TABLET ORAL DAILY
Qty: 0 | Refills: 0 | Status: DISCONTINUED | OUTPATIENT
Start: 2018-10-13 | End: 2018-10-14

## 2018-10-13 RX ADMIN — CARVEDILOL PHOSPHATE 12.5 MILLIGRAM(S): 80 CAPSULE, EXTENDED RELEASE ORAL at 18:03

## 2018-10-13 RX ADMIN — APIXABAN 5 MILLIGRAM(S): 2.5 TABLET, FILM COATED ORAL at 18:03

## 2018-10-13 RX ADMIN — Medication 250 MILLIGRAM(S): at 23:42

## 2018-10-13 RX ADMIN — CHLORHEXIDINE GLUCONATE 1 APPLICATION(S): 213 SOLUTION TOPICAL at 13:39

## 2018-10-13 RX ADMIN — ATORVASTATIN CALCIUM 40 MILLIGRAM(S): 80 TABLET, FILM COATED ORAL at 23:42

## 2018-10-13 RX ADMIN — LISINOPRIL 20 MILLIGRAM(S): 2.5 TABLET ORAL at 18:03

## 2018-10-13 RX ADMIN — Medication 166.67 MILLIGRAM(S): at 09:22

## 2018-10-13 RX ADMIN — CLOPIDOGREL BISULFATE 75 MILLIGRAM(S): 75 TABLET, FILM COATED ORAL at 13:39

## 2018-10-13 RX ADMIN — APIXABAN 5 MILLIGRAM(S): 2.5 TABLET, FILM COATED ORAL at 06:17

## 2018-10-13 RX ADMIN — Medication 1: at 18:44

## 2018-10-13 RX ADMIN — CARVEDILOL PHOSPHATE 12.5 MILLIGRAM(S): 80 CAPSULE, EXTENDED RELEASE ORAL at 06:17

## 2018-10-13 RX ADMIN — Medication 1: at 13:38

## 2018-10-13 NOTE — PROGRESS NOTE ADULT - PROBLEM SELECTOR PLAN 7
Patient follows up with Dr. Herrera for cardiology.   -C/w Plavix 75mg daily  -Reports that he no longer takes ASA 81mg since starting Eliquis  -Started on lipitor 40  -Restarted home coreg

## 2018-10-13 NOTE — PHYSICAL THERAPY INITIAL EVALUATION ADULT - REHAB POTENTIAL, PT EVAL
Pt is independent in all aspects of functional mobility. No skilled PT needs. Recommend ambulation ad venus in unit with supervision from RN daily while in-house. Pt verbalized understanding. RN informed.

## 2018-10-13 NOTE — PROGRESS NOTE ADULT - REASON FOR ADMISSION
MRSA Abscess

## 2018-10-13 NOTE — PROGRESS NOTE ADULT - PROBLEM SELECTOR PLAN 3
Patient w/ history of Atrial fibrillation. Currently rate controlled.  -C/w Eliquis 5mg BID  -Restarted home Coreg 12.5mg BID     #CKD stage 4:  -GFR of 29  -Avoid nephrotoxic agents  - restarted home lisinopril

## 2018-10-13 NOTE — PHYSICAL THERAPY INITIAL EVALUATION ADULT - PERTINENT HX OF CURRENT PROBLEM, REHAB EVAL
Per EMR, 69 y/o M w/ PMHx notable for DM, HTN, CAD s/p PCI, CABG (2008), Afib on Eliquis, CKD and recurrent MRSA abscess who is presenting with upper left back abscess.

## 2018-10-13 NOTE — PROGRESS NOTE ADULT - PROBLEM SELECTOR PLAN 6
-Patient takes Norvasc 5mg daily, Lisinopril 20mg daily   -restarted home lisinopril, can add norvasc if still hypertensive

## 2018-10-13 NOTE — PROGRESS NOTE ADULT - PROBLEM SELECTOR PLAN 5
Patient reports taking Victoza and Glibiride at home. Currently unsure of dosages.   -ISS, and adjust insulin accordingly  -HgbA1C 7.1%  -ISS

## 2018-10-13 NOTE — PROGRESS NOTE ADULT - SUBJECTIVE AND OBJECTIVE BOX
INTERVAL HPI/OVERNIGHT EVENTS:  Clarified with patient and his wife - he had been off antibiotics since some time in mid-September.  Last antibiotic was TMP/SMX.  Remains afebrile.    CONSTITUTIONAL:  No fever, chills, night sweats  EYES:  No photophobia or visual changes  CARDIOVASCULAR:  No chest pain  RESPIRATORY:  No cough, wheezing, or SOB   GASTROINTESTINAL:  No nausea, vomiting, diarrhea, constipation, or abdominal pain  GENITOURINARY:  No frequency, urgency, dysuria or hematuria  NEUROLOGIC:  No headache, lightheadedness      ANTIBIOTICS/RELEVANT:  Vancomycin 1.25 g IV q12h        Vital Signs Last 24 Hrs  T(C): 36.9 (13 Oct 2018 15:55), Max: 37 (13 Oct 2018 08:54)  T(F): 98.5 (13 Oct 2018 15:55), Max: 98.6 (13 Oct 2018 08:54)  HR: 60 (13 Oct 2018 15:55) (50 - 68)  BP: 169/71 (13 Oct 2018 15:55) (144/64 - 169/71)  BP(mean): --  RR: 17 (13 Oct 2018 15:55) (17 - 18)  SpO2: 98% (13 Oct 2018 15:55) (96% - 99%)    PHYSICAL EXAM:  Constitutional:  Well-developed, well nourished  Eyes:  Sclerae anicterica, conjunctivae clear, PERRL  Ear/Nose/Throat:  No nasal exudate or sinus tenderness;  No buccal mucosal lesions, no pharyngeal erythema or exudate	  Neck:  Supple, no adenopathy  Back:  Abscess site upper back with wound vac in place, less surrounding erythema & induration - limited to inferior portion of wound   Respiratory:  Clear bilaterally  Cardiovascular:  RRR, S1S2, no murmur appreciated  Gastrointestinal:  Symmetric, normoactive BS, soft, NT, no masses, guarding or rebound.  No HSM  Extremities:  No edema.  Left groin abscess closed, smaller with some residual induration    LABS:                        9.6    6.0   )-----------( 249      ( 13 Oct 2018 06:54 )             29.6         10-13    139  |  103  |  15  ----------------------------<  133<H>  3.6   |  23  |  1.17    Ca    8.9      13 Oct 2018 06:54  Phos  2.9     10-13  Mg     1.9     10-12    TPro  6.6  /  Alb  2.9<L>  /  TBili  0.5  /  DBili  x   /  AST  22  /  ALT  26  /  AlkPhos  75  10-13      Urinalysis Basic - ( 12 Oct 2018 11:22 )    Color: Yellow / Appearance: Clear / S.015 / pH: x  Gluc: x / Ketone: NEGATIVE  / Bili: Negative / Urobili: 0.2 E.U./dL   Blood: x / Protein: Trace mg/dL / Nitrite: NEGATIVE   Leuk Esterase: NEGATIVE / RBC: < 5 /HPF / WBC < 5 /HPF   Sq Epi: x / Non Sq Epi: 0-5 /HPF / Bacteria: Present /HPF    Vancomycin level 26.7 (dose at 18;38, drawn at 23:42);  19.3 at 6:54    MICROBIOLOGY:    Blood cultures 10/10 X 3- NGTD    RADIOLOGY & ADDITIONAL STUDIES:

## 2018-10-13 NOTE — PROGRESS NOTE ADULT - SUBJECTIVE AND OBJECTIVE BOX
OVERNIGHT EVENTS: No acute events overnight.     SUBJECTIVE / INTERVAL HPI: Patient seen and examined at bedside. The patient has no complaints, has wound vac on his back and has no pain at the site of the abscess/I&D. He says the hard abscess on his L scrotum is improved from prior (no I&D done per surgery). He otherwise denies n/v/d, abd pain, f/c.     VITAL SIGNS:  Vital Signs Last 24 Hrs  T(C): 37 (13 Oct 2018 08:54), Max: 37 (13 Oct 2018 08:54)  T(F): 98.6 (13 Oct 2018 08:54), Max: 98.6 (13 Oct 2018 08:54)  HR: 50 (13 Oct 2018 08:54) (50 - 68)  BP: 167/83 (13 Oct 2018 08:54) (144/64 - 186/71)  BP(mean): --  RR: 18 (13 Oct 2018 08:54) (17 - 18)  SpO2: 96% (13 Oct 2018 08:54) (96% - 99%)    PHYSICAL EXAM:    General: WDWN  HEENT: NC/AT; anicteric sclera; MMM  Cardiovascular: +S1/S2, RRR, no m/r/g  Respiratory: CTA B/L; no W/R/R  Gastrointestinal: soft, NT/ND  Back: Wound vac on abscess in upper back, appears in place with no leakage  Extremities: WWP; no edema, clubbing or cyanosis  Vascular: 2+ radial, DP/PT pulses B/L  Neurological: AAOx3; no focal deficits    MEDICATIONS:  MEDICATIONS  (STANDING):  apixaban 5 milliGRAM(s) Oral every 12 hours  atorvastatin 40 milliGRAM(s) Oral at bedtime  carvedilol 12.5 milliGRAM(s) Oral every 12 hours  chlorhexidine 2% Cloths 1 Application(s) Topical daily  clopidogrel Tablet 75 milliGRAM(s) Oral daily  dextrose 5%. 1000 milliLiter(s) (50 mL/Hr) IV Continuous <Continuous>  dextrose 50% Injectable 12.5 Gram(s) IV Push once  dextrose 50% Injectable 25 Gram(s) IV Push once  dextrose 50% Injectable 25 Gram(s) IV Push once  insulin lispro (HumaLOG) corrective regimen sliding scale   SubCutaneous Before meals and at bedtime  lisinopril 20 milliGRAM(s) Oral daily  sodium chloride 0.9%. 1000 milliLiter(s) (70 mL/Hr) IV Continuous <Continuous>  vancomycin  IVPB 1250 milliGRAM(s) IV Intermittent every 12 hours    MEDICATIONS  (PRN):  acetaminophen   Tablet .. 650 milliGRAM(s) Oral every 6 hours PRN Moderate Pain (4 - 6)  dextrose 40% Gel 15 Gram(s) Oral once PRN Blood Glucose LESS THAN 70 milliGRAM(s)/deciliter  glucagon  Injectable 1 milliGRAM(s) IntraMuscular once PRN Glucose LESS THAN 70 milligrams/deciliter  morphine  - Injectable 2 milliGRAM(s) IV Push every 6 hours PRN Severe Pain (7 - 10)    ALLERGIES:  Allergies    No Known Allergies  Intolerances    LABS:                        9.6    6.0   )-----------( 249      ( 13 Oct 2018 06:54 )             29.6     10-13    139  |  103  |  15  ----------------------------<  133<H>  3.6   |  23  |  1.17    Ca    8.9      13 Oct 2018 06:54  Phos  2.9     10-13  Mg     1.9     10-12    TPro  6.6  /  Alb  2.9<L>  /  TBili  0.5  /  DBili  x   /  AST  22  /  ALT  26  /  AlkPhos  75  10-13      Urinalysis Basic - ( 12 Oct 2018 11:22 )    Color: Yellow / Appearance: Clear / S.015 / pH: x  Gluc: x / Ketone: NEGATIVE  / Bili: Negative / Urobili: 0.2 E.U./dL   Blood: x / Protein: Trace mg/dL / Nitrite: NEGATIVE   Leuk Esterase: NEGATIVE / RBC: < 5 /HPF / WBC < 5 /HPF   Sq Epi: x / Non Sq Epi: 0-5 /HPF / Bacteria: Present /HPF    CAPILLARY BLOOD GLUCOSE  POCT Blood Glucose.: 180 mg/dL (13 Oct 2018 12:33)    RADIOLOGY & ADDITIONAL TESTS: Reviewed.

## 2018-10-13 NOTE — PROGRESS NOTE ADULT - SUBJECTIVE AND OBJECTIVE BOX
Patient is a 68y old  Male who presents with a chief complaint of MRSA Abscess (12 Oct 2018 18:09)      INTERVAL HPI/OVERNIGHT EVENTS:  would like to go home  states L groin lesion smaller than prior     Review of Systems: 12 point review of systems otherwise negative    MEDICATIONS  (STANDING):  apixaban 5 milliGRAM(s) Oral every 12 hours  atorvastatin 40 milliGRAM(s) Oral at bedtime  carvedilol 12.5 milliGRAM(s) Oral every 12 hours  chlorhexidine 2% Cloths 1 Application(s) Topical daily  clopidogrel Tablet 75 milliGRAM(s) Oral daily  dextrose 5%. 1000 milliLiter(s) (50 mL/Hr) IV Continuous <Continuous>  dextrose 50% Injectable 12.5 Gram(s) IV Push once  dextrose 50% Injectable 25 Gram(s) IV Push once  dextrose 50% Injectable 25 Gram(s) IV Push once  insulin lispro (HumaLOG) corrective regimen sliding scale   SubCutaneous Before meals and at bedtime  sodium chloride 0.9%. 1000 milliLiter(s) (70 mL/Hr) IV Continuous <Continuous>  vancomycin  IVPB 1250 milliGRAM(s) IV Intermittent every 12 hours    MEDICATIONS  (PRN):  acetaminophen   Tablet .. 650 milliGRAM(s) Oral every 6 hours PRN Moderate Pain (4 - 6)  dextrose 40% Gel 15 Gram(s) Oral once PRN Blood Glucose LESS THAN 70 milliGRAM(s)/deciliter  glucagon  Injectable 1 milliGRAM(s) IntraMuscular once PRN Glucose LESS THAN 70 milligrams/deciliter  morphine  - Injectable 2 milliGRAM(s) IV Push every 6 hours PRN Severe Pain (7 - 10)      Allergies    No Known Allergies    Intolerances          Vital Signs Last 24 Hrs  T(C): 37 (13 Oct 2018 08:54), Max: 37 (13 Oct 2018 08:54)  T(F): 98.6 (13 Oct 2018 08:54), Max: 98.6 (13 Oct 2018 08:54)  HR: 50 (13 Oct 2018 08:54) (50 - 68)  BP: 167/83 (13 Oct 2018 08:54) (144/64 - 186/71)  BP(mean): --  RR: 18 (13 Oct 2018 08:54) (17 - 18)  SpO2: 96% (13 Oct 2018 08:54) (96% - 99%)  CAPILLARY BLOOD GLUCOSE      POCT Blood Glucose.: 180 mg/dL (13 Oct 2018 12:33)  POCT Blood Glucose.: 141 mg/dL (13 Oct 2018 08:41)  POCT Blood Glucose.: 177 mg/dL (12 Oct 2018 22:27)  POCT Blood Glucose.: 129 mg/dL (12 Oct 2018 17:56)      10-12 @ 07:01  -  10-13 @ 07:00  --------------------------------------------------------  IN: 0 mL / OUT: 50 mL / NET: -50 mL        Physical Exam:    Daily     Daily   General:  Well appearing, NAD,   HEENT-on vEEG  CV:  RRR,  Lungs:  CTA B/L  Abdomen:  Soft, non-tender, no distended, positive BS  Back-on wound vac  L groin-no flatulence, no drainage  Neuro:  AAOx3    LABS:                        9.6    6.0   )-----------( 249      ( 13 Oct 2018 06:54 )             29.6     10-13    139  |  103  |  15  ----------------------------<  133<H>  3.6   |  23  |  1.17    Ca    8.9      13 Oct 2018 06:54  Phos  2.9     10-13  Mg     1.9     10-12    TPro  6.6  /  Alb  2.9<L>  /  TBili  0.5  /  DBili  x   /  AST  22  /  ALT  26  /  AlkPhos  75  10-13      Urinalysis Basic - ( 12 Oct 2018 11:22 )    Color: Yellow / Appearance: Clear / S.015 / pH: x  Gluc: x / Ketone: NEGATIVE  / Bili: Negative / Urobili: 0.2 E.U./dL   Blood: x / Protein: Trace mg/dL / Nitrite: NEGATIVE   Leuk Esterase: NEGATIVE / RBC: < 5 /HPF / WBC < 5 /HPF   Sq Epi: x / Non Sq Epi: 0-5 /HPF / Bacteria: Present /HPF

## 2018-10-13 NOTE — PROVIDER CONTACT NOTE (CRITICAL VALUE NOTIFICATION) - PERSON GIVING RESULT:
Informed pt of new medication and lab results/ Pt has already started Lipitor and will call in a month to make sure orders are available for his recheck. Pt voiced understanding and appreciation.    dimitry ADLER

## 2018-10-13 NOTE — PROGRESS NOTE ADULT - PROBLEM SELECTOR PLAN 1
Patient w/ recent purulent MRSA abscess of left christelle-gluteal region with tract formation to left groin that has since improved s/p IV abx and 2 various oral abx. Patient is now presenting w/ Left upper back abscess that is about 9x7cm erythematous, tender with palpable induration. No drainage noted.   -s/p Vancomycin 1g in the ED. C/w Vancomycin 1250mg BID  -F/u blood cultures  -F/u surgical wound cx - per surgery was sent but not in lab, unclear where it was lost  - no intervention on L groin abscess per surgery    #Rash  -Patient w/ notable papules on LE, abdomen and back that are pruritic. Reports that they began since returning from the [place].   -No eosinophils noted on CBC  -Likely folliculitis Patient w/ recent purulent MRSA abscess of left christelle-gluteal region with tract formation to left groin that has since improved s/p IV abx and 2 various oral abx. Patient is now presenting w/ Left upper back abscess that is about 9x7cm erythematous, tender with palpable induration. No drainage noted.   -s/p Vancomycin 1g in the ED. C/w Vancomycin 1000mg BID  -F/u blood cultures  -F/u surgical wound cx - per surgery was sent but not in lab, unclear where it was lost  - no intervention on L groin abscess per surgery    #Rash  -Patient w/ notable papules on LE, abdomen and back that are pruritic. Reports that they began since returning from the [place].   -No eosinophils noted on CBC  -Likely folliculitis

## 2018-10-13 NOTE — PHYSICAL THERAPY INITIAL EVALUATION ADULT - OCCUPATION
Previously a . 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

## 2018-10-13 NOTE — PROGRESS NOTE ADULT - PROBLEM SELECTOR PLAN 8
F: NS @70cc/hr   E: Replete cautiously in the setting of CKD  N: NPO after midnight

## 2018-10-13 NOTE — PROGRESS NOTE ADULT - PROBLEM SELECTOR PLAN 10
1) PCP Contacted on Admission: (Y/N) --> Name & Phone #: Dr. Goel  [phone]  2) Date of Contact with PCP:  3) PCP Contacted at Discharge: (Y/N, N/A)  4) Summary of Handoff Given to PCP:   5) Post-Discharge Appointment Date and Location:
1) PCP Contacted on Admission: (Y/N) --> Name & Phone #: Dr. Goel  [phone]  2) Date of Contact with PCP:  3) PCP Contacted at Discharge: (Y/N, N/A)  4) Summary of Handoff Given to PCP:   5) Post-Discharge Appointment Date and Location:
1) PCP Contacted on Admission: (Y/N) --> Name & Phone #: Dr. Goel  [phone]  2) Date of Contact with PCP:  3) PCP Contacted at Discharge: (Y/N, N/A)  4) Summary of Handoff Given to PCP:   5) Post-Discharge Appointment Date and Location:

## 2018-10-13 NOTE — PROGRESS NOTE ADULT - PROBLEM SELECTOR PLAN 9
PPX: Eliquis   FULL CODE  DISPO: [email or web address]

## 2018-10-13 NOTE — PHYSICAL THERAPY INITIAL EVALUATION ADULT - LEVEL OF INDEPENDENCE: STAIR NEGOTIATION, REHAB EVAL
unable to perform/2/2 EEG connection. Pt has 5/5 MMT and normal balance. Not a barrier for discharge.

## 2018-10-13 NOTE — PHYSICAL THERAPY INITIAL EVALUATION ADULT - GENERAL OBSERVATIONS, REHAB EVAL
Pt received supine in NAD, +wound vac, +IV, +EEG. He is on contact precautions. He is alert and oriented x 3. He is able to follow commands.

## 2018-10-13 NOTE — EEG REPORT - NS EEG TEXT BOX
[facility] Department of Neurology  Inpatient Continuous Video Electroencephalography Report    Patient Name:	LALO GONZALEZ    :	1950  MRN:	[id number]    Study Start Date/Time:  10/12/2018, 9:05:52 PM  Study End Date/Time:	    Referred by: Dr. Fredrick Claudio    Brief Clinical History:  LALO GONZALEZ is a 67 y/o M w/ PMHx notable for DM, HTN, CAD s/p PCI, CABG (), Afib on Eliquis, CKD and recurrent MRSA abscess who is presenting with upper left back abscess with episode of LOC.    Diagnosis Code:   R56.9 convulsions/seizure  CPT: 02641 vEEG 12-24 hours    Acquisition Details:  Electroencephalography was acquired using a minimum of 21 channels on an [employer] system v 8.5.1 with electrode placement according to the standard International 10-20 system following ACNS (American Clinical Neurophysiology Society) guidelines for Long-Term Video EEG monitoring.  Anterior temporal T1 and T2 electrodes were utilized whenever possible. The [employer] automated spike & seizure detections were all reviewed in detail, in addition to extensive portions of raw EEG.    Day 1: 10/12/2018 @ 9:05:52 PM to next morning @ 0700  Pertinent medications: none  Background   Symmetry: Symmetric.  Frequencies: Predominantly alpha, overriding beta.  Anterior-posterior (AP) gradient: Present.  Posterior Dominant Rhythm: 9 Hz symmetric, well-organized, and well-modulated.  Voltage:  Normal (most activity >20 uV).  Continuity: continuous,   Variability: Yes. 						  Reactivity: Yes.  Breach: No.  N2 sleep: Symmetric spindles and K complexes.  Epileptiform discharges:  No epileptiform discharges.  Abnormal periodic/rhythmic activity: No .  Events:  No electrographic seizures or paroxysmal clinical events.  Provocations: Hyperventilation and photic were not performed  Other findings: Fp1  electrode artifact in latter portion.    Daily summary and impression:  The EEG remained normal during this day of monitoring. There were no findings of active epilepsy, however this alone does not rule out the diagnosis.      Read by:  Jesica Mendoza MD

## 2018-10-13 NOTE — PROGRESS NOTE ADULT - PROBLEM SELECTOR PLAN 1
ID following  no cx to follow  on Vanco IV; will clarify with ID for po recs  Surgery to change to home wound vac upon dc

## 2018-10-13 NOTE — PROGRESS NOTE ADULT - PROBLEM SELECTOR PLAN 2
As per wife, pt got up quickly, fainted and was unconscious for 2-3 minutes and was confused and very slow to respond afterwards. Prior to episode patient was very diaphoretic and clammy. Patients glucose was normal during episode (164). Concern for possible vasovagal syncope given poor PO intake, active infection vs febrile seizure vs. meningitis though less likely given pt with no nuchal rigidity on exam and is afebrile w/ no leukocytosis.   -CT head negative for hemorrhage or infarct.   -F/u vEEG

## 2018-10-13 NOTE — PROGRESS NOTE ADULT - SUBJECTIVE AND OBJECTIVE BOX
SUBJECTIVE: Patient examined bedside. Denies f/c/n/v.     Vital Signs Last 24 Hrs  T(C): 36.9 (13 Oct 2018 15:55), Max: 37 (13 Oct 2018 08:54)  T(F): 98.5 (13 Oct 2018 15:55), Max: 98.6 (13 Oct 2018 08:54)  HR: 60 (13 Oct 2018 15:55) (50 - 68)  BP: 169/71 (13 Oct 2018 15:55) (144/64 - 169/71)  BP(mean): --  RR: 17 (13 Oct 2018 15:55) (17 - 18)  SpO2: 98% (13 Oct 2018 15:55) (96% - 99%)    General: NAD, resting comfortably in bed  Pulm: Nonlabored breathing, no respiratory distress  Back: Wound vac functioning after replaced, wound without purulence, appropriately erythematous      LABS:                        9.6    6.0   )-----------( 249      ( 13 Oct 2018 06:54 )             29.6     10-13    139  |  103  |  15  ----------------------------<  133<H>  3.6   |  23  |  1.17    Ca    8.9      13 Oct 2018 06:54  Phos  2.9     10-13  Mg     1.9     10-12    TPro  6.6  /  Alb  2.9<L>  /  TBili  0.5  /  DBili  x   /  AST  22  /  ALT  26  /  AlkPhos  75  10-13

## 2018-10-13 NOTE — PROGRESS NOTE ADULT - PROBLEM SELECTOR PLAN 4
Hgb of 9.9 on admission. Etiology likely secondary to iron deficiency anemia in the setting of CKD.   -Labs consistent with MEÑO  -maintain active T&S   -No active signs of bleeding

## 2018-10-13 NOTE — PROGRESS NOTE ADULT - ASSESSMENT
67 y/o M w/ PMHx DM, HTN, CAD s/p PCI, CABG (2008), Afib on Eliquis, CKD and recurrent MRSA abscess, now present with upper back abscess s/p incision and drainage and wound vac placement.    - Wound VAC holding good suction. Encourage patient to lie on side to prevent kinking of wound VAC tubing.  - If patient is to be discharged today or tomorrow, wound VAC can be taken off and a wet to dry dressing can be placed. Patient can be discharged with wet to dry dressing  - Patient will need home wound VAC services set up for next week. If unable due to insurance purposes, patient will need VNS services set up prior to discharge for daily wet to dry dressing changes of surgical area.  - Plan discussed with attending and chief resident.
67 y/o M w/ PMHx DM, HTN, CAD s/p PCI, CABG (2008), Afib on Eliquis, CKD and recurrent MRSA abscess, now present with upper back abscess s/p incision and drainage and wound vac placement.    - Wound VAC replaced  - If patient is to be discharged  tomorrow, wound VAC can be taken off and a wet to dry dressing can be placed. Patient can be discharged with wet to dry dressing  - Plan discussed with chief resident.
67 y/o M w/ PMHx DM, HTN, CAD s/p PCI, CABG (2008), Afib on Eliquis, CKD and recurrent MRSA abscess, now present with upper back abscess s/p incision and drainage and wound vac placement.  Unfortunately, no specimens appear to have been received in Micro lab from OR.  Presumably was MRSA, as he has had recurrent abscesses in past.  Vancomycin level of 26 was not true trough - was at 19.3.  Discussed role of autoinoculation and need for frequent handwashing.  Discussed problem of likely inability to permanently decolonize and likelihood of further recurrences with Mr. Eckert and his wife.  Suggest:  - Agree with decreasing vancomycin to 1 g IV q12h - trough prior to 3rd dose since decrease.  - When he is d/dayday - can give TMP/SMX DS q12h - would supply with two week course  - Would try mupirocin ointment bid X 5 d around time of active infection, daily chlorhexidine baths to try to minimize recurrences  He will follow up with me - my office will call him to arrange for prior to completion of antibiotics.  Recommendations discussed with primary team.  Please recall if further ID input is desired – ID service.
67 y/o M w/ PMHx DM, HTN, CAD s/p PCI, CABG (2008), Afib on Eliquis, CKD and recurrent MRSA abscess, now present with upper back abscess, no documented fevers, WBC of 8.1, hemodynamically stable.    - Added on for OR today for incisions and drainage of upper back abscess.  - Team 4C will follow.  - Plan discussed with attending chief resident.
67 y/o M w/ PMHx notable for DM, HTN, CAD s/p PCI, CABG (2008), Afib on Eliquis, CKD and recurrent MRSA abscess who is presenting with upper left back abscess.
69 y/o M w/ PMHx DM, HTN, CAD s/p PCI, CABG (2008), Afib on Eliquis, CKD and recurrent MRSA abscess, now present with upper back abscess s/p incision and drainage and wound vac placement.  Unfortunately, no specimens appear to have been received in Micro lab from OR, making further recommendations very difficult.  Suggest:  - Try to track cultures from Micro lab (per Surgery were sent)  - Continue vancomycin 1.25 g IV q12h - trough prior to 3rd dose since increase - due at 7 pm tonight.  Recommendations discussed with primary team.  Will continue to follow with you – ID service.
69 y/o M w/ PMHx notable for DM, HTN, CAD s/p PCI, CABG (2008), Afib on Eliquis, CKD and recurrent MRSA abscess who is presenting with upper left back abscess.
69 y/o M w/ PMHx notable for DM, HTN, CAD s/p PCI, CABG (2008), Afib on Eliquis, CKD and recurrent MRSA abscess who is presenting with upper left back abscess.

## 2018-10-13 NOTE — PROGRESS NOTE ADULT - PROBLEM SELECTOR PROBLEM 2
CAD (coronary artery disease)
Toxic metabolic encephalopathy

## 2018-10-14 VITALS
DIASTOLIC BLOOD PRESSURE: 76 MMHG | SYSTOLIC BLOOD PRESSURE: 173 MMHG | HEART RATE: 62 BPM | OXYGEN SATURATION: 97 % | RESPIRATION RATE: 16 BRPM | TEMPERATURE: 98 F

## 2018-10-14 PROCEDURE — 99239 HOSP IP/OBS DSCHRG MGMT >30: CPT

## 2018-10-14 RX ORDER — DIPHENHYDRAMINE HCL 50 MG
25 CAPSULE ORAL EVERY 6 HOURS
Qty: 0 | Refills: 0 | Status: DISCONTINUED | OUTPATIENT
Start: 2018-10-14 | End: 2018-10-14

## 2018-10-14 RX ORDER — MUPIROCIN 20 MG/G
1 OINTMENT TOPICAL
Qty: 1 | Refills: 0 | OUTPATIENT
Start: 2018-10-14 | End: 2018-10-18

## 2018-10-14 RX ORDER — AZTREONAM 2 G
1 VIAL (EA) INJECTION
Qty: 28 | Refills: 0 | OUTPATIENT
Start: 2018-10-14 | End: 2018-10-27

## 2018-10-14 RX ADMIN — LISINOPRIL 20 MILLIGRAM(S): 2.5 TABLET ORAL at 06:53

## 2018-10-14 RX ADMIN — Medication 25 MILLIGRAM(S): at 12:52

## 2018-10-14 RX ADMIN — CLOPIDOGREL BISULFATE 75 MILLIGRAM(S): 75 TABLET, FILM COATED ORAL at 10:06

## 2018-10-14 RX ADMIN — CARVEDILOL PHOSPHATE 12.5 MILLIGRAM(S): 80 CAPSULE, EXTENDED RELEASE ORAL at 06:53

## 2018-10-14 RX ADMIN — APIXABAN 5 MILLIGRAM(S): 2.5 TABLET, FILM COATED ORAL at 06:53

## 2018-10-14 RX ADMIN — Medication 2: at 13:08

## 2018-10-14 RX ADMIN — Medication 250 MILLIGRAM(S): at 10:06

## 2018-10-15 LAB
CULTURE RESULTS: SIGNIFICANT CHANGE UP
SPECIMEN SOURCE: SIGNIFICANT CHANGE UP

## 2018-10-15 PROCEDURE — 85730 THROMBOPLASTIN TIME PARTIAL: CPT

## 2018-10-15 PROCEDURE — 99285 EMERGENCY DEPT VISIT HI MDM: CPT | Mod: 25

## 2018-10-15 PROCEDURE — 93306 TTE W/DOPPLER COMPLETE: CPT

## 2018-10-15 PROCEDURE — 82728 ASSAY OF FERRITIN: CPT

## 2018-10-15 PROCEDURE — 86140 C-REACTIVE PROTEIN: CPT

## 2018-10-15 PROCEDURE — 80048 BASIC METABOLIC PNL TOTAL CA: CPT

## 2018-10-15 PROCEDURE — 85610 PROTHROMBIN TIME: CPT

## 2018-10-15 PROCEDURE — 86850 RBC ANTIBODY SCREEN: CPT

## 2018-10-15 PROCEDURE — 36415 COLL VENOUS BLD VENIPUNCTURE: CPT

## 2018-10-15 PROCEDURE — 95951: CPT

## 2018-10-15 PROCEDURE — 85027 COMPLETE CBC AUTOMATED: CPT

## 2018-10-15 PROCEDURE — 84484 ASSAY OF TROPONIN QUANT: CPT

## 2018-10-15 PROCEDURE — 83550 IRON BINDING TEST: CPT

## 2018-10-15 PROCEDURE — 86900 BLOOD TYPING SEROLOGIC ABO: CPT

## 2018-10-15 PROCEDURE — 82962 GLUCOSE BLOOD TEST: CPT

## 2018-10-15 PROCEDURE — 81001 URINALYSIS AUTO W/SCOPE: CPT

## 2018-10-15 PROCEDURE — 70450 CT HEAD/BRAIN W/O DYE: CPT

## 2018-10-15 PROCEDURE — 85652 RBC SED RATE AUTOMATED: CPT

## 2018-10-15 PROCEDURE — 84466 ASSAY OF TRANSFERRIN: CPT

## 2018-10-15 PROCEDURE — 83615 LACTATE (LD) (LDH) ENZYME: CPT

## 2018-10-15 PROCEDURE — 83036 HEMOGLOBIN GLYCOSYLATED A1C: CPT

## 2018-10-15 PROCEDURE — 80053 COMPREHEN METABOLIC PANEL: CPT

## 2018-10-15 PROCEDURE — 87040 BLOOD CULTURE FOR BACTERIA: CPT

## 2018-10-15 PROCEDURE — 86901 BLOOD TYPING SEROLOGIC RH(D): CPT

## 2018-10-15 PROCEDURE — 82553 CREATINE MB FRACTION: CPT

## 2018-10-15 PROCEDURE — 84100 ASSAY OF PHOSPHORUS: CPT

## 2018-10-15 PROCEDURE — 97162 PT EVAL MOD COMPLEX 30 MIN: CPT

## 2018-10-15 PROCEDURE — 85025 COMPLETE CBC W/AUTO DIFF WBC: CPT

## 2018-10-15 PROCEDURE — 83605 ASSAY OF LACTIC ACID: CPT

## 2018-10-15 PROCEDURE — 82550 ASSAY OF CK (CPK): CPT

## 2018-10-15 PROCEDURE — 83010 ASSAY OF HAPTOGLOBIN QUANT: CPT

## 2018-10-15 PROCEDURE — 80202 ASSAY OF VANCOMYCIN: CPT

## 2018-10-15 PROCEDURE — 96374 THER/PROPH/DIAG INJ IV PUSH: CPT

## 2018-10-15 PROCEDURE — 83735 ASSAY OF MAGNESIUM: CPT

## 2018-10-19 PROBLEM — E11.9 TYPE 2 DIABETES MELLITUS WITHOUT COMPLICATIONS: Chronic | Status: ACTIVE | Noted: 2018-10-10

## 2018-10-19 PROBLEM — I25.10 ATHEROSCLEROTIC HEART DISEASE OF NATIVE CORONARY ARTERY WITHOUT ANGINA PECTORIS: Chronic | Status: ACTIVE | Noted: 2018-10-10

## 2018-10-19 PROBLEM — I10 ESSENTIAL (PRIMARY) HYPERTENSION: Chronic | Status: ACTIVE | Noted: 2018-10-10

## 2018-10-19 PROBLEM — I48.91 UNSPECIFIED ATRIAL FIBRILLATION: Chronic | Status: ACTIVE | Noted: 2018-10-10

## 2018-10-19 PROBLEM — N18.9 CHRONIC KIDNEY DISEASE, UNSPECIFIED: Chronic | Status: ACTIVE | Noted: 2018-10-10

## 2018-10-26 DIAGNOSIS — G92 TOXIC ENCEPHALOPATHY: ICD-10-CM

## 2018-10-26 DIAGNOSIS — D50.9 IRON DEFICIENCY ANEMIA, UNSPECIFIED: ICD-10-CM

## 2018-10-26 DIAGNOSIS — Z79.01 LONG TERM (CURRENT) USE OF ANTICOAGULANTS: ICD-10-CM

## 2018-10-26 DIAGNOSIS — Z95.1 PRESENCE OF AORTOCORONARY BYPASS GRAFT: ICD-10-CM

## 2018-10-26 DIAGNOSIS — L02.212 CUTANEOUS ABSCESS OF BACK [ANY PART, EXCEPT BUTTOCK]: ICD-10-CM

## 2018-10-26 DIAGNOSIS — I12.9 HYPERTENSIVE CHRONIC KIDNEY DISEASE WITH STAGE 1 THROUGH STAGE 4 CHRONIC KIDNEY DISEASE, OR UNSPECIFIED CHRONIC KIDNEY DISEASE: ICD-10-CM

## 2018-10-26 DIAGNOSIS — E11.22 TYPE 2 DIABETES MELLITUS WITH DIABETIC CHRONIC KIDNEY DISEASE: ICD-10-CM

## 2018-10-26 DIAGNOSIS — B95.62 METHICILLIN RESISTANT STAPHYLOCOCCUS AUREUS INFECTION AS THE CAUSE OF DISEASES CLASSIFIED ELSEWHERE: ICD-10-CM

## 2018-10-26 DIAGNOSIS — Z79.84 LONG TERM (CURRENT) USE OF ORAL HYPOGLYCEMIC DRUGS: ICD-10-CM

## 2018-10-26 DIAGNOSIS — I48.91 UNSPECIFIED ATRIAL FIBRILLATION: ICD-10-CM

## 2018-10-26 DIAGNOSIS — L03.312 CELLULITIS OF BACK [ANY PART EXCEPT BUTTOCK]: ICD-10-CM

## 2018-10-26 DIAGNOSIS — N18.4 CHRONIC KIDNEY DISEASE, STAGE 4 (SEVERE): ICD-10-CM

## 2018-10-26 DIAGNOSIS — D63.1 ANEMIA IN CHRONIC KIDNEY DISEASE: ICD-10-CM

## 2018-10-26 DIAGNOSIS — I25.10 ATHEROSCLEROTIC HEART DISEASE OF NATIVE CORONARY ARTERY WITHOUT ANGINA PECTORIS: ICD-10-CM

## 2018-10-26 DIAGNOSIS — L73.9 FOLLICULAR DISORDER, UNSPECIFIED: ICD-10-CM

## 2018-11-06 ENCOUNTER — APPOINTMENT (OUTPATIENT)
Dept: INFECTIOUS DISEASE | Facility: CLINIC | Age: 68
End: 2018-11-06

## 2020-03-31 NOTE — H&P ADULT - NSHPPHYSICALEXAM_GEN_ALL_CORE
Addended by: KELSEY MCDONOUGH on: 3/31/2020 07:52 AM     Modules accepted: Orders    
.  LABS:                         9.9    8.1   )-----------( 238      ( 10 Oct 2018 19:04 )             29.9     10-10    139  |  100  |  37<H>  ----------------------------<  116<H>  3.3<L>   |  24  |  2.30<H>    Ca    9.2      10 Oct 2018 19:04    TPro  7.4  /  Alb  3.5  /  TBili  0.6  /  DBili  x   /  AST  38  /  ALT  45  /  AlkPhos  84  10-10    PT/INR - ( 10 Oct 2018 19:04 )   PT: 14.2 sec;   INR: 1.27          PTT - ( 10 Oct 2018 19:04 )  PTT:33.8 sec    CARDIAC MARKERS ( 10 Oct 2018 19:04 )  x     / <0.01 ng/mL / 48 U/L / x     / 1.6 ng/mL        Lactate, Blood: 1.2 mmoL/L (10-10 @ 19:05)      RADIOLOGY, EKG & ADDITIONAL TESTS: Reviewed.

## 2022-04-08 NOTE — DIETITIAN INITIAL EVALUATION ADULT. - PATIENT PROFILE REVIEWED
pt stated he is unable to afford his meds, would like the meds to be changed yes pt stated he is unable to afford his meds, would like the meds to be changed.

## 2022-07-03 NOTE — PATIENT PROFILE ADULT - DO YOU FEEL THREATENED BY OTHERS?
Uri, sinus congestion only, denies fever , malaise or seasonal allergies.     Orders Placed This Encounter   Medications    azithromycin (ZITHROMAX) 250 MG tablet     Sig: Take 1 tablet by mouth See Admin Instructions Take 2 tablets at once on the first day , then one tablet daily till all are gone     Dispense:  6 tablet     Refill:  0 no

## 2024-02-12 NOTE — PHYSICAL THERAPY INITIAL EVALUATION ADULT - LEVEL OF INDEPENDENCE: SIT/SUPINE, REHAB EVAL
Called to give report, Berna will call me back, she is discharging another patient at this time.   independent